# Patient Record
Sex: MALE | Race: WHITE | NOT HISPANIC OR LATINO | Employment: FULL TIME | URBAN - METROPOLITAN AREA
[De-identification: names, ages, dates, MRNs, and addresses within clinical notes are randomized per-mention and may not be internally consistent; named-entity substitution may affect disease eponyms.]

---

## 2023-07-14 LAB
ALANINE AMINOTRANSFERASE (SGPT) (U/L) IN SER/PLAS: 41 U/L (ref 10–52)
ALBUMIN (G/DL) IN SER/PLAS: 4.3 G/DL (ref 3.4–5)
ALKALINE PHOSPHATASE (U/L) IN SER/PLAS: 53 U/L (ref 33–120)
AMPHETAMINE (PRESENCE) IN URINE BY SCREEN METHOD: ABNORMAL
ANION GAP IN SER/PLAS: 12 MMOL/L (ref 10–20)
ASPARTATE AMINOTRANSFERASE (SGOT) (U/L) IN SER/PLAS: 22 U/L (ref 9–39)
BARBITURATES PRESENCE IN URINE BY SCREEN METHOD: ABNORMAL
BASOPHILS (10*3/UL) IN BLOOD BY AUTOMATED COUNT: 0.06 X10E9/L (ref 0–0.1)
BASOPHILS/100 LEUKOCYTES IN BLOOD BY AUTOMATED COUNT: 0.9 % (ref 0–2)
BENZODIAZEPINE (PRESENCE) IN URINE BY SCREEN METHOD: ABNORMAL
BILIRUBIN TOTAL (MG/DL) IN SER/PLAS: 0.6 MG/DL (ref 0–1.2)
CALCIDIOL (25 OH VITAMIN D3) (NG/ML) IN SER/PLAS: 25 NG/ML
CALCIUM (MG/DL) IN SER/PLAS: 9.4 MG/DL (ref 8.6–10.3)
CANNABINOIDS IN URINE BY SCREEN METHOD: ABNORMAL
CARBON DIOXIDE, TOTAL (MMOL/L) IN SER/PLAS: 26 MMOL/L (ref 21–32)
CHLORIDE (MMOL/L) IN SER/PLAS: 105 MMOL/L (ref 98–107)
CHOLESTEROL (MG/DL) IN SER/PLAS: 157 MG/DL (ref 0–199)
CHOLESTEROL IN HDL (MG/DL) IN SER/PLAS: 36.6 MG/DL
CHOLESTEROL/HDL RATIO: 4.3
COBALAMIN (VITAMIN B12) (PG/ML) IN SER/PLAS: 574 PG/ML (ref 211–911)
COCAINE (PRESENCE) IN URINE BY SCREEN METHOD: ABNORMAL
CREATININE (MG/DL) IN SER/PLAS: 0.85 MG/DL (ref 0.5–1.3)
DRUG SCREEN COMMENT URINE: ABNORMAL
EOSINOPHILS (10*3/UL) IN BLOOD BY AUTOMATED COUNT: 0.2 X10E9/L (ref 0–0.7)
EOSINOPHILS/100 LEUKOCYTES IN BLOOD BY AUTOMATED COUNT: 2.9 % (ref 0–6)
ERYTHROCYTE DISTRIBUTION WIDTH (RATIO) BY AUTOMATED COUNT: 13.8 % (ref 11.5–14.5)
ERYTHROCYTE MEAN CORPUSCULAR HEMOGLOBIN CONCENTRATION (G/DL) BY AUTOMATED: 33.3 G/DL (ref 32–36)
ERYTHROCYTE MEAN CORPUSCULAR VOLUME (FL) BY AUTOMATED COUNT: 86 FL (ref 80–100)
ERYTHROCYTES (10*6/UL) IN BLOOD BY AUTOMATED COUNT: 5.01 X10E12/L (ref 4.5–5.9)
ESTIMATED AVERAGE GLUCOSE FOR HBA1C: 103 MG/DL
FENTANYL URINE: ABNORMAL
FERRITIN (UG/LL) IN SER/PLAS: 110 UG/L (ref 20–300)
FOLATE (NG/ML) IN SER/PLAS: 17.2 NG/ML
GFR MALE: >90 ML/MIN/1.73M2
GLUCOSE (MG/DL) IN SER/PLAS: 96 MG/DL (ref 74–99)
HEMATOCRIT (%) IN BLOOD BY AUTOMATED COUNT: 43.2 % (ref 41–52)
HEMOGLOBIN (G/DL) IN BLOOD: 14.4 G/DL (ref 13.5–17.5)
HEMOGLOBIN A1C/HEMOGLOBIN TOTAL IN BLOOD: 5.2 %
IMMATURE GRANULOCYTES/100 LEUKOCYTES IN BLOOD BY AUTOMATED COUNT: 0.1 % (ref 0–0.9)
IRON (UG/DL) IN SER/PLAS: 90 UG/DL (ref 35–150)
IRON BINDING CAPACITY (UG/DL) IN SER/PLAS: 341 UG/DL (ref 240–445)
IRON SATURATION (%) IN SER/PLAS: 26 % (ref 25–45)
LDL: 96 MG/DL (ref 0–99)
LEUKOCYTES (10*3/UL) IN BLOOD BY AUTOMATED COUNT: 6.8 X10E9/L (ref 4.4–11.3)
LYMPHOCYTES (10*3/UL) IN BLOOD BY AUTOMATED COUNT: 2.13 X10E9/L (ref 1.2–4.8)
LYMPHOCYTES/100 LEUKOCYTES IN BLOOD BY AUTOMATED COUNT: 31.3 % (ref 13–44)
METHADONE (PRESENCE) IN URINE BY SCREEN METHOD: ABNORMAL
MONOCYTES (10*3/UL) IN BLOOD BY AUTOMATED COUNT: 0.68 X10E9/L (ref 0.1–1)
MONOCYTES/100 LEUKOCYTES IN BLOOD BY AUTOMATED COUNT: 10 % (ref 2–10)
NEUTROPHILS (10*3/UL) IN BLOOD BY AUTOMATED COUNT: 3.72 X10E9/L (ref 1.2–7.7)
NEUTROPHILS/100 LEUKOCYTES IN BLOOD BY AUTOMATED COUNT: 54.8 % (ref 40–80)
OPIATES (PRESENCE) IN URINE BY SCREEN METHOD: ABNORMAL
OXYCODONE (PRESENCE) IN URINE BY SCREEN METHOD: ABNORMAL
PARATHYRIN INTACT (PG/ML) IN SER/PLAS: 39.2 PG/ML (ref 18.5–88)
PHENCYCLIDINE (PRESENCE) IN URINE BY SCREEN METHOD: ABNORMAL
PLATELETS (10*3/UL) IN BLOOD AUTOMATED COUNT: 420 X10E9/L (ref 150–450)
POTASSIUM (MMOL/L) IN SER/PLAS: 3.9 MMOL/L (ref 3.5–5.3)
PROTEIN TOTAL: 7.6 G/DL (ref 6.4–8.2)
SODIUM (MMOL/L) IN SER/PLAS: 139 MMOL/L (ref 136–145)
THYROTROPIN (MIU/L) IN SER/PLAS BY DETECTION LIMIT <= 0.05 MIU/L: 3.28 MIU/L (ref 0.44–3.98)
THYROXINE (T4) (UG/DL) IN SER/PLAS: 7.9 UG/DL (ref 4.5–11.1)
THYROXINE (T4) FREE INDEX IN SER/PLAS BY CALCULATION: 2.8 (ref 1.6–4.7)
TRIGLYCERIDE (MG/DL) IN SER/PLAS: 120 MG/DL (ref 0–149)
TRIIODOTHYRONINE RESIN UPTAKE (T3RU) % IN SER/PLAS: 36 % (ref 24–41)
UREA NITROGEN (MG/DL) IN SER/PLAS: 12 MG/DL (ref 6–23)
VLDL: 24 MG/DL (ref 0–40)

## 2023-07-15 LAB — H. PYLORI UBIT: NEGATIVE

## 2023-07-19 LAB
COPPER: 113.5 UG/DL (ref 70–140)
ZINC,SERUM OR PLASMA: 88 UG/DL (ref 60–120)

## 2023-07-20 LAB
AMPHETAMINES,URINE: <50 NG/ML
COTININE BLOOD QUANTITATIVE: <5 NG/ML
MDA,URINE: <200 NG/ML
MDEA,URINE: <200 NG/ML
MDMA,UR: <200 NG/ML
METHAMPHETAMINE QUANTITATIVE URINE: <200 NG/ML
NICOTINE BLOOD QUANTITATIVE: <5 NG/ML
PHENTERMINE,UR: >5000 NG/ML
VITAMIN B1, WHOLE BLOOD: NORMAL

## 2023-08-23 ENCOUNTER — HOSPITAL ENCOUNTER (OUTPATIENT)
Dept: DATA CONVERSION | Facility: HOSPITAL | Age: 33
End: 2023-08-23
Attending: SURGERY | Admitting: SURGERY
Payer: MEDICAID

## 2023-08-23 DIAGNOSIS — R12 HEARTBURN: ICD-10-CM

## 2023-08-23 DIAGNOSIS — G47.33 OBSTRUCTIVE SLEEP APNEA (ADULT) (PEDIATRIC): ICD-10-CM

## 2023-08-23 DIAGNOSIS — F41.9 ANXIETY DISORDER, UNSPECIFIED: ICD-10-CM

## 2023-08-23 DIAGNOSIS — K31.89 OTHER DISEASES OF STOMACH AND DUODENUM: ICD-10-CM

## 2023-08-23 DIAGNOSIS — E66.01 MORBID (SEVERE) OBESITY DUE TO EXCESS CALORIES (MULTI): ICD-10-CM

## 2023-08-23 DIAGNOSIS — Z01.818 ENCOUNTER FOR OTHER PREPROCEDURAL EXAMINATION: ICD-10-CM

## 2023-08-28 ENCOUNTER — TELEMEDICINE (OUTPATIENT)
Dept: PRIMARY CARE | Facility: CLINIC | Age: 33
End: 2023-08-28
Payer: MEDICAID

## 2023-08-28 LAB
COMPLETE PATHOLOGY REPORT: NORMAL
CONVERTED CLINICAL DIAGNOSIS-HISTORY: NORMAL
CONVERTED FINAL DIAGNOSIS: NORMAL
CONVERTED FINAL REPORT PDF LINK TO COPY AND PASTE: NORMAL
CONVERTED GROSS DESCRIPTION: NORMAL

## 2023-09-29 VITALS — BODY MASS INDEX: 46.65 KG/M2 | WEIGHT: 315 LBS | HEIGHT: 69 IN

## 2023-09-30 NOTE — H&P
History of Present Illness:   History Present Illness:  Reason for surgery: reflux   HPI:    33M w/ BMI 48 here for EGD before undergoing a sleeve gastrectomy.  occasional reflux     Comorbidities: Obesity, Mild RADHIKA, Anxiety, Back Pain  Social:  The patient is  with 3 children, employed FT  Tobacco use:no, Illicit drug use: no, Alcohol use: Socially  Currently exercisin times per week, biking  History of abuse- Denies  Past Medical History: Sciatica-Left side, Asthma  Past Surgical History: Dental  Family History: Vascular Disorder, Breast CA  Allergies: NKDA  History of DVT/PE: Denies     Review of Systems  Constitutional: not feeling tired.  Eyes: no eyesight problems.  ENT: no hearing loss and no nosebleeds.  Cardiovascular: no intermittent leg claudication.  Respiratory: no chronic cough and no shortness of breath.  Gastrointestinal: no change in bowel habits and no blood in stools.  Genitourinary: no urinary frequency, no incontinence and no hematuria.  Musculoskeletal: no arthralgias, no myalgias and no back pain.  Skin: no skin rashes.  Neurological: no seizures and no frequent falls.  Psychiatric: no depression and not suicidal.  Endocrine: no recent weight gain and no recent weight loss.  Hematologic/Lymphatic: no swollen glands in the neck and does not bruise easily.  All other systems have been reviewed and are negative for complaint    Allergies:        Allergies:  ·  NKDA :   ·  Shellfish : Unknown    Home Medication Review:   Home Medications Reviewed: yes     Impression/Procedure:   ·  Impression and Planned Procedure: 33F w/ BMI 44 here for EGD for reflux.       ERAS (Enhanced Recovery After Surgery):  ·  ERAS Patient: yes   ·  CPM/PAT Utilization: yes   ·  Immunonutrition Recovery Drink Utilization: yes   ·  Carbohydrate Supplement Drink Utilization: no       Physical Exam by System:    Constitutional: Well developed, awake/alert/oriented  x3, no distress, alert and cooperative    Eyes: PERRL, EOMI, clear sclera   ENMT: mucous membranes moist, no apparent injury,  no lesions seen   Head/Neck: Neck supple, no apparent injury, thyroid  without mass or tenderness, No JVD   Respiratory/Thorax: Patent airways, CTAB, normal  breath sounds with good chest expansion, thorax symmetric   Cardiovascular: Regular, rate and rhythm, no murmurs,  warm extremities   Gastrointestinal: Nondistended, soft, non-tender,  no rebound tenderness or guarding, no masses palpable, no organomegaly   Extremities: normal extremities, no cyanosis edema,  contusions or wounds, no clubbing   Neurological: alert and oriented x3, intact senses,  motor, response and reflexes, normal strength   Skin: Warm and dry, no lesions, no rashes     Consent:   COVID-19 Consent:  ·  COVID-19 Risk Consent Surgeon has reviewed key risks related to the risk of marisa COVID-19 and if they contract COVID-19 what the risks are.       Electronic Signatures:  Liliam Berg)   (Signed 23-Aug-2023 08:26)   Co-Signer: History of Present Illness, Allergies, Home Medication Review, Impression/Procedure, Physical Exam, ERAS, Consent, Note Completion  Christy Walker)   (Signed 23-Aug-2023 07:24)   Authored: History of Present Illness, Allergies, Home Medication Review, Impression/Procedure, Physical Exam, ERAS, Consent, Note Completion    Last Updated: 23-Aug-2023 08:26 by Liliam Berg)

## 2023-10-10 DIAGNOSIS — Z98.84 BARIATRIC SURGERY STATUS: ICD-10-CM

## 2023-10-13 ENCOUNTER — TELEMEDICINE CLINICAL SUPPORT (OUTPATIENT)
Dept: SURGERY | Facility: CLINIC | Age: 33
End: 2023-10-13
Payer: MEDICAID

## 2023-10-18 ENCOUNTER — DOCUMENTATION (OUTPATIENT)
Dept: SURGERY | Facility: CLINIC | Age: 33
End: 2023-10-18
Payer: MEDICAID

## 2023-10-18 NOTE — PROGRESS NOTES
Sent patient a [facility] message to remind him to get his B1 drawn so we can submit his case to insurance for final approval.

## 2023-11-20 ENCOUNTER — TELEPHONE (OUTPATIENT)
Dept: SURGERY | Facility: CLINIC | Age: 33
End: 2023-11-20
Payer: MEDICAID

## 2023-11-20 ENCOUNTER — LAB (OUTPATIENT)
Dept: LAB | Facility: LAB | Age: 33
End: 2023-11-20
Payer: MEDICAID

## 2023-11-20 DIAGNOSIS — Z98.84 BARIATRIC SURGERY STATUS: ICD-10-CM

## 2023-11-20 PROCEDURE — 36415 COLL VENOUS BLD VENIPUNCTURE: CPT

## 2023-11-20 NOTE — TELEPHONE ENCOUNTER
Spoke with patient and reminded him of his B1 lab needing to be complete.  He states he will get this done today.  I did remind the patient that if he cannot go today to please make it a priority to go this week as his psych will  on . Once B1 comes back, we can submit his case to insurance.

## 2023-11-27 ENCOUNTER — DOCUMENTATION (OUTPATIENT)
Dept: SURGERY | Facility: CLINIC | Age: 33
End: 2023-11-27
Payer: MEDICAID

## 2023-11-27 DIAGNOSIS — Z98.84 BARIATRIC SURGERY STATUS: ICD-10-CM

## 2023-11-27 NOTE — PROGRESS NOTES
Letter of Medical Necessity    23      ATTN: Pre-Authorization  Department           Tentative Surgery Date:  2024    RE: Soren Cheema    : 1990         BMI: 48.87    Weight: 330      Height: 5'9''     Mr. Soren Cheema suffers from multiple health conditions including Morbid Obesity (E66.01). An extensive clinical evaluation has been completed and the final recommendation has been provided to Mr. Cheema; explanation of short and long term surgical risks vs. benefits has been reviewed at length. Mr. Cheema has verbalized an understanding of the associated risks, has agreed to comply with behavioral and lifestyle changes that support a successful post-surgical outcome, resolution of comorbid conditions and improvement in quality of life.    The patient suffers from the following health conditions:   There is no problem list on file for this patient.        We kindly request careful review of the following documents: Surgeon's consultation with weight related comorbidities and diagnoses, primary care support letter, psychiatric evaluation,  nutrition assessment and other pertinent information required by the member's plan.      Mr. Cheema has attended our pre-operative educational programs and verbalizes that he has an understanding of the behaviors and choices necessary to be successful with bariatric surgery for a lifetime. Mr. Cheema further understands that in order to be successful he must attend post-operative visits at 1week, 6 weeks, 3 months, 6 months, 12 months, and annually to review him progress and consult with our registered dietitian. In addition Mr. Cheema agrees to attend monthly support group meetings hosted by our licensed program staff to further enhance him chances of successful lifetime weight loss. he has agreed to participate in exercise 5 days per week as tolerated and has already been advised  to increase him physical activity in preparation for surgery.              We kindly request  review of prior-authorization for a 1 to 2 day hospital stay for procedure Laparoscopic sleeve gastrectomy  [id number] with Dr. Liliam Berg  NPI:[id number]  TID: [id number] at [facility]. Tentative surgery date 1/27/2024.    Please fax your approval or requests for  additional information to the attention of Stephanie Snow, Patient Navigator at [phone], this is a secured fax line.    We sincerely appreciate your thoughtful review of this case.      Sincerely,  Dr. Liliam Berg  [facility]    Enclosures

## 2023-12-05 DIAGNOSIS — E66.01 MORBID OBESITY DUE TO EXCESS CALORIES (MULTI): Primary | ICD-10-CM

## 2023-12-06 ENCOUNTER — TELEPHONE (OUTPATIENT)
Dept: SURGERY | Facility: CLINIC | Age: 33
End: 2023-12-06
Payer: MEDICAID

## 2023-12-06 PROBLEM — E88.810 METABOLIC SYNDROME: Status: ACTIVE | Noted: 2022-05-23

## 2023-12-06 PROBLEM — E66.01 OBESITY, CLASS III, BMI 40-49.9 (MORBID OBESITY) (MULTI): Status: ACTIVE | Noted: 2022-05-23

## 2023-12-06 PROBLEM — E55.9 VITAMIN D DEFICIENCY: Status: ACTIVE | Noted: 2017-09-09

## 2023-12-06 PROBLEM — M54.41 CHRONIC MIDLINE LOW BACK PAIN WITH RIGHT-SIDED SCIATICA: Status: ACTIVE | Noted: 2022-06-03

## 2023-12-06 PROBLEM — Z98.84 BARIATRIC SURGERY STATUS: Status: ACTIVE | Noted: 2023-12-06

## 2023-12-06 PROBLEM — G89.29 CHRONIC MIDLINE LOW BACK PAIN WITH RIGHT-SIDED SCIATICA: Status: ACTIVE | Noted: 2022-06-03

## 2023-12-06 NOTE — PATIENT INSTRUCTIONS
You met with Dr. Berg today to discuss changing your procedure from Gastric Sleeve to Elpidio-en-Y Gastric Bypass.

## 2023-12-06 NOTE — PROGRESS NOTES
BARIATRIC SURGERY CLINIC  FOLLOW UP NOTE      Name: Soren Cheema  MRN: [id number]      Index Surgery  Date of Surgery: TENTATIVE 2/7/24   Surgeon: Dr. Liliam Berg    Surgical Procedure: PENDING today's visit     HPI: 33 year old male presenting today to discuss the possibility of changing his procedure from gastric sleeve to miguelina-en-y gastric bypass.  Insurance approval for gastric sleeve obtained, patient informed staff of wanting to change procedure upon scheduling.           No current outpatient medications on file.     No current facility-administered medications for this visit.       Comorbidities:  Patient Active Problem List   Diagnosis    Chronic midline low back pain with right-sided sciatica    Metabolic syndrome    Obesity, Class III, BMI 40-49.9 (morbid obesity) (CMS/[facility])    Vitamin D deficiency    Bariatric surgery status         REVIEW OF SYSTEMS:  CONSTITUTIONAL: Patient denies fevers, chills, sweats and weight changes.  CARDIOVASCULAR: Patient denies chest pains, palpitations, orthopnea and paroxysmal nocturnal dyspnea.  RESPIRATORY: No dyspnea on exertion, no wheezing or cough.  GI: No nausea, vomiting, diarrhea, constipation, abdominal pain, hematochezia or melena.  MUSCULOSKELETAL: No myalgias or arthralgias.  NEUROLOGIC: No chronic headaches, no seizures. Patient denies numbness, tingling or weakness.  PSYCHIATRIC: Patient denies problems with mood disturbance. No problems with anxiety.  ENDOCRINE: No excessive urination or excessive thirst.  DERMATOLOGIC: Patient denies any rashes or skin changes.    PHYSICAL EXAM:  There were no vitals taken for this visit.  Virtual visit       ASSESSMENT & PLAN:  33 year old male presenting today to discuss the possibility of changing his procedure from gastric sleeve to miguelina-en-y gastric bypass.  Insurance approval for gastric sleeve obtained, patient informed staff of wanting to change procedure upon scheduling.       Patient's spouse had a gastric bypass  and they have been talking about it and he wants to proceed with gastric bypass instead of sleeve gastrectomy which he was previously interested in.    I explained the main differences between the 2 operations limitations of not being able to use NSAIDs for the risk of ulcer formation and staying away from smoking which she does not do anyway.  Requirement for multivitamin supplementation for the rest of life and high risk of long-term complications related to rerouting of the intestine.  All this was discussed with the patient and he wants to proceed with gastric bypass instead of sleeve.    There is no contraindication for him to proceed with either of these surgeries and we will schedule him for bypass as he desires.

## 2023-12-06 NOTE — TELEPHONE ENCOUNTER
Spoke with patient, he wants gastric bypass as opposed to gastric sleeve.  Approved for sleeve.  Advised that he will meet with Dr. Berg virtually tomorrow and if he decides bypass is the plan then we will need to resubmit his entire case. Patient verbalized understanding

## 2023-12-07 ENCOUNTER — TELEMEDICINE (OUTPATIENT)
Dept: SURGERY | Facility: CLINIC | Age: 33
End: 2023-12-07
Payer: MEDICAID

## 2023-12-07 DIAGNOSIS — M54.41 CHRONIC MIDLINE LOW BACK PAIN WITH RIGHT-SIDED SCIATICA: ICD-10-CM

## 2023-12-07 DIAGNOSIS — E55.9 VITAMIN D DEFICIENCY: ICD-10-CM

## 2023-12-07 DIAGNOSIS — E66.01 OBESITY, CLASS III, BMI 40-49.9 (MORBID OBESITY) (MULTI): Primary | ICD-10-CM

## 2023-12-07 DIAGNOSIS — E88.810 METABOLIC SYNDROME: ICD-10-CM

## 2023-12-07 DIAGNOSIS — Z98.84 BARIATRIC SURGERY STATUS: ICD-10-CM

## 2023-12-07 DIAGNOSIS — G89.29 CHRONIC MIDLINE LOW BACK PAIN WITH RIGHT-SIDED SCIATICA: ICD-10-CM

## 2023-12-07 PROCEDURE — 99212 OFFICE O/P EST SF 10 MIN: CPT | Mod: GT | Performed by: SURGERY

## 2023-12-07 PROCEDURE — 99212 OFFICE O/P EST SF 10 MIN: CPT | Performed by: SURGERY

## 2023-12-08 ENCOUNTER — TELEPHONE (OUTPATIENT)
Dept: SURGERY | Facility: HOSPITAL | Age: 33
End: 2023-12-08
Payer: MEDICAID

## 2023-12-08 DIAGNOSIS — E66.01 MORBID OBESITY (MULTI): Primary | ICD-10-CM

## 2023-12-13 PROBLEM — E66.01 MORBID OBESITY (MULTI): Status: ACTIVE | Noted: 2023-12-08

## 2024-01-10 ENCOUNTER — TELEPHONE (OUTPATIENT)
Dept: SURGERY | Facility: CLINIC | Age: 34
End: 2024-01-10
Payer: MEDICAID

## 2024-01-10 NOTE — TELEPHONE ENCOUNTER
Emailed NG booklet and preop diet   Instructed to start diet on 1/24 and to call if he has questions.   Jessica

## 2024-01-22 PROBLEM — G47.33 OSA (OBSTRUCTIVE SLEEP APNEA): Status: ACTIVE | Noted: 2024-01-22

## 2024-01-22 PROBLEM — E66.01 MORBID OBESITY (MULTI): Status: RESOLVED | Noted: 2023-12-08 | Resolved: 2024-01-22

## 2024-01-22 NOTE — H&P (VIEW-ONLY)
BARIATRIC SURGERY PREOPERATIVE VISIT    Date: 01/22/24  Time: [unfilled]    Name: Soren Cheema    MRN: [id number]    This is a 33 y.o. y.o. male with morbid obesity (Body mass index is 46.22 kg/m².) who plans to undergo Laparoscopic miguelina en y gastric bypass 19810 surgery. They have completed a rigorous preoperative medical work-up and bariatric surgery educational program.     PMH:   Patient Active Problem List   Diagnosis    Chronic midline low back pain with right-sided sciatica    Metabolic syndrome    Obesity, Class III, BMI 40-49.9 (morbid obesity) (CMS/[facility])    Vitamin D deficiency    Bariatric surgery status    RADHIKA (obstructive sleep apnea)       PSH: No past surgical history on file.    Social hx:   Social History     Socioeconomic History    Marital status: Single     Spouse name: Not on file    Number of children: Not on file    Years of education: Not on file    Highest education level: Not on file   Occupational History    Not on file   Tobacco Use    Smoking status: Not on file    Smokeless tobacco: Not on file   Substance and Sexual Activity    Alcohol use: Not on file    Drug use: Not on file    Sexual activity: Not on file   Other Topics Concern    Not on file   Social History Narrative    Not on file     Social Determinants of Health     Financial Resource Strain: Not on file   Food Insecurity: Not on file   Transportation Needs: Not on file   Physical Activity: Not on file   Stress: Not on file   Social Connections: Not on file   Intimate Partner Violence: Not on file   Housing Stability: Not on file       Initial weight: 329  Current weight: There were no vitals filed for this visit.    Preop Clearances:  Cardiac: Cleared  Psych: Cleared    History of Clotting Disorder: No  Anticoagulation plan: N/A    Sleep Study: Mild apnea, Refuses CPAP     EGD 8/23/23:   Findings:       The Z-line was regular and was found 38 cm from the incisors.       The gastroesophageal flap valve was visualized endoscopically  and        classified as Hill Grade II (fold present, opens with respiration).       Striped mildly erythematous mucosa without bleeding was found in the        prepyloric region of the stomach. Biopsies were taken with a cold        forceps for Helicobacter pylori testing. Estimated blood loss was        minimal.       The exam of the stomach was otherwise normal.       The ampulla, duodenal bulb, first portion of the duodenum and second        portion of the duodenum were normal.  Estimated Blood Loss:       Estimated blood loss was minimal.  Impression:            - Z-line regular, 38 cm from the incisors.                         - Gastroesophageal flap valve classified as Hill Grade                          II (fold present, opens with respiration).                         - Erythematous mucosa in the prepyloric region of the                          stomach. Biopsied.                         - Normal ampulla, duodenal bulb, first portion of the                          duodenum and second portion of the duodenum.    Preadmission testing date: PENDING    MEDICATIONS:  Prior to Admission Medications:  No current outpatient medications on file.    ALLERGIES:  Not on File    REVIEW OF SYSTEMS:  GENERAL: Obese. Negative for malaise, significant weight loss and fever  NECK: Negative for lumps, goiter, pain and significant neck swelling  RESPIRATORY: Negative for cough, wheezing or shortness of breath.  CARDIOVASCULAR: Negative for chest pain, leg swelling or palpitations.  GI: Negative for abdominal discomfort, blood in stools or black stools or change in bowel habits  : No history of dysuria, frequency or incontinence  MUSCULOSKELETAL: Negative for joint pain or swelling, back pain or muscle pain.  SKIN: Negative for lesions, rash, and itching.  PSYCH: Negative for sleep disturbance, mood disorder and recent psychosocial stressors.  ENDOCRINE: Negative for cold or heat intolerance, polyuria, polydipsia and  goiter.    PHYSICAL EXAM:  There were no vitals taken for this visit.    General appearance: obese  Skin: warm, no erythema or rashes  Lungs: clear to percussion and auscultation  Heart: regular rhythm and S1, S2 normal  Abdomen: soft, non-tender, no masses, no organomegaly  Extremities: Normal exam of the extremities. No swelling or pain.    No results found for this or any previous visit (from the past 24 hour(s)).    IMPRESSION:  Soren Cheema is a 33 y.o. y.o. male with a BMI of Body mass index is 46.22 kg/m²..    They have been preoperatively evaluated and deemed to be an appropriate candidate for bariatric surgery.  Surgery Type: Laparoscopic miguelina en y gastric bypass 83630    All testing reviewed.  All clearances contained.    PLAN:  The risks of Laparoscopic miguelina en y gastric bypass 63615 surgery including bleeding, leak, wound infection, dehydration, ulcers, internal hernia, DVT/PE, prolonged nausea/vomiting, incomplete resolution of associated medical conditions, reflux, weight regain, vitamin/mineral deficiencies, and death have been explained to the patient and Soren Cheema has expressed understanding and acceptance of them.    The benefits of the above surgery including weight loss, improvement/resolution of associated medical and mental health conditions, improved mobility, and decreased mortality have been explained the the patient and Soren Cheema has expressed understanding and acceptance of them.    Operative and blood transfusion consent forms were signed by the patient and witnessed today.  He was to be placed on DVT prophylaxis after the surgery, will do Lovenox for 4 weeks.    Prescriptions for all required post-operative home medications were sent to the patient's pharmacy today and the patient will pick them up prior to surgery.    Further education was provided on day of surgery instructions and what to expect from the inpatient admission after surgery.    Liliam Berg MD   Bariatric and  Minimally Invasive General Surgery

## 2024-01-22 NOTE — PROGRESS NOTES
BARIATRIC SURGERY PREOPERATIVE VISIT    Date: 01/22/24  Time: [unfilled]    Name: Soren Cheema    MRN: [id number]    This is a 33 y.o. y.o. male with morbid obesity (Body mass index is 46.22 kg/m².) who plans to undergo Laparoscopic miguelina en y gastric bypass 74500 surgery. They have completed a rigorous preoperative medical work-up and bariatric surgery educational program.     PMH:   Patient Active Problem List   Diagnosis    Chronic midline low back pain with right-sided sciatica    Metabolic syndrome    Obesity, Class III, BMI 40-49.9 (morbid obesity) (CMS/[facility])    Vitamin D deficiency    Bariatric surgery status    RADHIKA (obstructive sleep apnea)       PSH: No past surgical history on file.    Social hx:   Social History     Socioeconomic History    Marital status: Single     Spouse name: Not on file    Number of children: Not on file    Years of education: Not on file    Highest education level: Not on file   Occupational History    Not on file   Tobacco Use    Smoking status: Not on file    Smokeless tobacco: Not on file   Substance and Sexual Activity    Alcohol use: Not on file    Drug use: Not on file    Sexual activity: Not on file   Other Topics Concern    Not on file   Social History Narrative    Not on file     Social Determinants of Health     Financial Resource Strain: Not on file   Food Insecurity: Not on file   Transportation Needs: Not on file   Physical Activity: Not on file   Stress: Not on file   Social Connections: Not on file   Intimate Partner Violence: Not on file   Housing Stability: Not on file       Initial weight: 329  Current weight: There were no vitals filed for this visit.    Preop Clearances:  Cardiac: Cleared  Psych: Cleared    History of Clotting Disorder: No  Anticoagulation plan: N/A    Sleep Study: Mild apnea, Refuses CPAP     EGD 8/23/23:   Findings:       The Z-line was regular and was found 38 cm from the incisors.       The gastroesophageal flap valve was visualized endoscopically  and        classified as Hill Grade II (fold present, opens with respiration).       Striped mildly erythematous mucosa without bleeding was found in the        prepyloric region of the stomach. Biopsies were taken with a cold        forceps for Helicobacter pylori testing. Estimated blood loss was        minimal.       The exam of the stomach was otherwise normal.       The ampulla, duodenal bulb, first portion of the duodenum and second        portion of the duodenum were normal.  Estimated Blood Loss:       Estimated blood loss was minimal.  Impression:            - Z-line regular, 38 cm from the incisors.                         - Gastroesophageal flap valve classified as Hill Grade                          II (fold present, opens with respiration).                         - Erythematous mucosa in the prepyloric region of the                          stomach. Biopsied.                         - Normal ampulla, duodenal bulb, first portion of the                          duodenum and second portion of the duodenum.    Preadmission testing date: PENDING    MEDICATIONS:  Prior to Admission Medications:  No current outpatient medications on file.    ALLERGIES:  Not on File    REVIEW OF SYSTEMS:  GENERAL: Obese. Negative for malaise, significant weight loss and fever  NECK: Negative for lumps, goiter, pain and significant neck swelling  RESPIRATORY: Negative for cough, wheezing or shortness of breath.  CARDIOVASCULAR: Negative for chest pain, leg swelling or palpitations.  GI: Negative for abdominal discomfort, blood in stools or black stools or change in bowel habits  : No history of dysuria, frequency or incontinence  MUSCULOSKELETAL: Negative for joint pain or swelling, back pain or muscle pain.  SKIN: Negative for lesions, rash, and itching.  PSYCH: Negative for sleep disturbance, mood disorder and recent psychosocial stressors.  ENDOCRINE: Negative for cold or heat intolerance, polyuria, polydipsia and  goiter.    PHYSICAL EXAM:  There were no vitals taken for this visit.    General appearance: obese  Skin: warm, no erythema or rashes  Lungs: clear to percussion and auscultation  Heart: regular rhythm and S1, S2 normal  Abdomen: soft, non-tender, no masses, no organomegaly  Extremities: Normal exam of the extremities. No swelling or pain.    No results found for this or any previous visit (from the past 24 hour(s)).    IMPRESSION:  Soren Cheema is a 33 y.o. y.o. male with a BMI of Body mass index is 46.22 kg/m²..    They have been preoperatively evaluated and deemed to be an appropriate candidate for bariatric surgery.  Surgery Type: Laparoscopic miguelina en y gastric bypass 30196    All testing reviewed.  All clearances contained.    PLAN:  The risks of Laparoscopic miguelina en y gastric bypass 20491 surgery including bleeding, leak, wound infection, dehydration, ulcers, internal hernia, DVT/PE, prolonged nausea/vomiting, incomplete resolution of associated medical conditions, reflux, weight regain, vitamin/mineral deficiencies, and death have been explained to the patient and Soren Cheema has expressed understanding and acceptance of them.    The benefits of the above surgery including weight loss, improvement/resolution of associated medical and mental health conditions, improved mobility, and decreased mortality have been explained the the patient and Soren Cheema has expressed understanding and acceptance of them.    Operative and blood transfusion consent forms were signed by the patient and witnessed today.  He was to be placed on DVT prophylaxis after the surgery, will do Lovenox for 4 weeks.    Prescriptions for all required post-operative home medications were sent to the patient's pharmacy today and the patient will pick them up prior to surgery.    Further education was provided on day of surgery instructions and what to expect from the inpatient admission after surgery.    Liliam Berg MD   Bariatric and  Minimally Invasive General Surgery

## 2024-01-22 NOTE — PATIENT INSTRUCTIONS
You are scheduled for Gastric Bypass with Dr. Berg on 2/7/2024.     YOU DO NOT NEED TO DO A BOWEL PREP.  You will be on clear liquids only starting the day prior to surgery. Please refer to your final pre op book/RD instructions.     You will receive a phone call the day prior to surgery with your OR arrival time.  Be sure to read over your Pre-Op book for final preparation for surgery.  Make a shopping list and  your supplements prior to surgery.     Prescriptions for Omeprazole (antacid), Oxycodone (pain),  and Ondansetron (anti-Nausea) have been sent to your retail pharmacy. Pick these up if you have not yet done so - these are for after surgery.    Be sure to take the omeprazole every day for six months starting when you get home from the hospital. Open the capsule and sprinkle over SF applesauce, pudding or yogurt. DO NOT MISS A DOSE.     Call with any questions! [phone] for Patricia.

## 2024-01-23 PROBLEM — E66.01 MORBID OBESITY (MULTI): Status: ACTIVE | Noted: 2023-12-08

## 2024-01-25 ENCOUNTER — APPOINTMENT (OUTPATIENT)
Dept: SURGERY | Facility: CLINIC | Age: 34
End: 2024-01-25
Payer: MEDICAID

## 2024-01-25 ENCOUNTER — OFFICE VISIT (OUTPATIENT)
Dept: SURGERY | Facility: CLINIC | Age: 34
End: 2024-01-25
Payer: MEDICAID

## 2024-01-25 VITALS
BODY MASS INDEX: 46.36 KG/M2 | OXYGEN SATURATION: 98 % | DIASTOLIC BLOOD PRESSURE: 69 MMHG | WEIGHT: 313 LBS | SYSTOLIC BLOOD PRESSURE: 109 MMHG | HEIGHT: 69 IN | HEART RATE: 60 BPM

## 2024-01-25 DIAGNOSIS — Z71.3 PRE-BARIATRIC SURGERY NUTRITION EVALUATION: ICD-10-CM

## 2024-01-25 DIAGNOSIS — R11.2 POST-OPERATIVE NAUSEA AND VOMITING: ICD-10-CM

## 2024-01-25 DIAGNOSIS — E88.810 METABOLIC SYNDROME: ICD-10-CM

## 2024-01-25 DIAGNOSIS — Z98.84 BARIATRIC SURGERY STATUS: ICD-10-CM

## 2024-01-25 DIAGNOSIS — Z98.890 POST-OPERATIVE NAUSEA AND VOMITING: ICD-10-CM

## 2024-01-25 DIAGNOSIS — M54.41 CHRONIC MIDLINE LOW BACK PAIN WITH RIGHT-SIDED SCIATICA: ICD-10-CM

## 2024-01-25 DIAGNOSIS — E55.9 VITAMIN D DEFICIENCY: ICD-10-CM

## 2024-01-25 DIAGNOSIS — G89.18 POST-OP PAIN: ICD-10-CM

## 2024-01-25 DIAGNOSIS — E66.01 OBESITY, CLASS III, BMI 40-49.9 (MORBID OBESITY) (MULTI): Primary | ICD-10-CM

## 2024-01-25 DIAGNOSIS — G89.29 CHRONIC MIDLINE LOW BACK PAIN WITH RIGHT-SIDED SCIATICA: ICD-10-CM

## 2024-01-25 DIAGNOSIS — Z29.9 ENCOUNTER FOR DEEP VEIN THROMBOSIS PROPHYLAXIS: ICD-10-CM

## 2024-01-25 DIAGNOSIS — G47.33 OSA (OBSTRUCTIVE SLEEP APNEA): ICD-10-CM

## 2024-01-25 PROCEDURE — 3008F BODY MASS INDEX DOCD: CPT | Performed by: SURGERY

## 2024-01-25 PROCEDURE — 99215 OFFICE O/P EST HI 40 MIN: CPT | Performed by: SURGERY

## 2024-01-25 RX ORDER — METRONIDAZOLE 500 MG/100ML
500 INJECTION, SOLUTION INTRAVENOUS ONCE
Status: CANCELLED | OUTPATIENT
Start: 2024-02-07 | End: 2024-01-25

## 2024-01-25 RX ORDER — ENOXAPARIN SODIUM 100 MG/ML
60 INJECTION SUBCUTANEOUS DAILY
Qty: 28 EACH | Refills: 0 | Status: SHIPPED | OUTPATIENT
Start: 2024-01-25 | End: 2024-02-22 | Stop reason: HOSPADM

## 2024-01-25 RX ORDER — OMEPRAZOLE 40 MG/1
40 CAPSULE, DELAYED RELEASE ORAL
Qty: 90 CAPSULE | Refills: 1 | Status: SHIPPED | OUTPATIENT
Start: 2024-01-25 | End: 2024-07-23

## 2024-01-25 RX ORDER — ONDANSETRON 4 MG/1
4 TABLET, ORALLY DISINTEGRATING ORAL EVERY 6 HOURS PRN
Qty: 60 TABLET | Refills: 1 | Status: SHIPPED | OUTPATIENT
Start: 2024-01-25 | End: 2024-03-18 | Stop reason: WASHOUT

## 2024-01-25 RX ORDER — HEPARIN SODIUM 5000 [USP'U]/ML
5000 INJECTION, SOLUTION INTRAVENOUS; SUBCUTANEOUS ONCE
Status: CANCELLED | OUTPATIENT
Start: 2024-01-25 | End: 2024-01-25

## 2024-01-25 RX ORDER — AMOXICILLIN AND CLAVULANATE POTASSIUM 875; 125 MG/1; MG/1
TABLET, FILM COATED ORAL
Status: ON HOLD | COMMUNITY
End: 2024-02-07 | Stop reason: ALTCHOICE

## 2024-01-25 RX ORDER — SCOLOPAMINE TRANSDERMAL SYSTEM 1 MG/1
1 PATCH, EXTENDED RELEASE TRANSDERMAL ONCE
Status: CANCELLED | OUTPATIENT
Start: 2024-01-25 | End: 2024-01-25

## 2024-01-25 RX ORDER — GABAPENTIN 300 MG/1
600 CAPSULE ORAL ONCE
Status: CANCELLED | OUTPATIENT
Start: 2024-01-25 | End: 2024-01-25

## 2024-01-25 RX ORDER — SODIUM CHLORIDE, SODIUM LACTATE, POTASSIUM CHLORIDE, CALCIUM CHLORIDE 600; 310; 30; 20 MG/100ML; MG/100ML; MG/100ML; MG/100ML
150 INJECTION, SOLUTION INTRAVENOUS CONTINUOUS
Status: CANCELLED | OUTPATIENT
Start: 2024-01-25

## 2024-01-25 RX ORDER — OXYCODONE HCL 5 MG/5 ML
5 SOLUTION, ORAL ORAL EVERY 6 HOURS PRN
Qty: 140 ML | Refills: 0 | Status: SHIPPED | OUTPATIENT
Start: 2024-01-25 | End: 2024-02-09 | Stop reason: HOSPADM

## 2024-02-01 ENCOUNTER — PRE-ADMISSION TESTING (OUTPATIENT)
Dept: PREADMISSION TESTING | Facility: HOSPITAL | Age: 34
End: 2024-02-01
Payer: MEDICAID

## 2024-02-01 VITALS
HEIGHT: 69 IN | WEIGHT: 313.05 LBS | SYSTOLIC BLOOD PRESSURE: 119 MMHG | TEMPERATURE: 96.8 F | HEART RATE: 71 BPM | BODY MASS INDEX: 46.37 KG/M2 | OXYGEN SATURATION: 97 % | RESPIRATION RATE: 20 BRPM | DIASTOLIC BLOOD PRESSURE: 81 MMHG

## 2024-02-01 DIAGNOSIS — E66.01 MORBID OBESITY DUE TO EXCESS CALORIES (MULTI): ICD-10-CM

## 2024-02-01 DIAGNOSIS — Z01.818 PREOP TESTING: Primary | ICD-10-CM

## 2024-02-01 LAB
ABO GROUP (TYPE) IN BLOOD: NORMAL
ALBUMIN SERPL BCP-MCNC: 4.5 G/DL (ref 3.4–5)
ALP SERPL-CCNC: 53 U/L (ref 33–120)
ALT SERPL W P-5'-P-CCNC: 27 U/L (ref 10–52)
ANION GAP SERPL CALC-SCNC: 13 MMOL/L (ref 10–20)
ANTIBODY SCREEN: NORMAL
APPEARANCE UR: CLEAR
AST SERPL W P-5'-P-CCNC: 20 U/L (ref 9–39)
BILIRUB SERPL-MCNC: 0.4 MG/DL (ref 0–1.2)
BILIRUB UR STRIP.AUTO-MCNC: NEGATIVE MG/DL
BUN SERPL-MCNC: 13 MG/DL (ref 6–23)
CALCIUM SERPL-MCNC: 10.1 MG/DL (ref 8.6–10.3)
CHLORIDE SERPL-SCNC: 104 MMOL/L (ref 98–107)
CO2 SERPL-SCNC: 25 MMOL/L (ref 21–32)
COLOR UR: YELLOW
COTININE UR QL SCN: NEGATIVE
CREAT SERPL-MCNC: 0.78 MG/DL (ref 0.5–1.3)
EGFRCR SERPLBLD CKD-EPI 2021: >90 ML/MIN/1.73M*2
ERYTHROCYTE [DISTWIDTH] IN BLOOD BY AUTOMATED COUNT: 12.6 % (ref 11.5–14.5)
GLUCOSE SERPL-MCNC: 82 MG/DL (ref 74–99)
GLUCOSE UR STRIP.AUTO-MCNC: NEGATIVE MG/DL
HCT VFR BLD AUTO: 45.4 % (ref 41–52)
HGB BLD-MCNC: 15.5 G/DL (ref 13.5–17.5)
INR PPP: 1.3 (ref 0.9–1.1)
KETONES UR STRIP.AUTO-MCNC: ABNORMAL MG/DL
LEUKOCYTE ESTERASE UR QL STRIP.AUTO: NEGATIVE
MCH RBC QN AUTO: 29 PG (ref 26–34)
MCHC RBC AUTO-ENTMCNC: 34.1 G/DL (ref 32–36)
MCV RBC AUTO: 85 FL (ref 80–100)
NITRITE UR QL STRIP.AUTO: NEGATIVE
NRBC BLD-RTO: 0 /100 WBCS (ref 0–0)
PH UR STRIP.AUTO: 6 [PH]
PLATELET # BLD AUTO: 438 X10*3/UL (ref 150–450)
POTASSIUM SERPL-SCNC: 4.5 MMOL/L (ref 3.5–5.3)
PROT SERPL-MCNC: 7.5 G/DL (ref 6.4–8.2)
PROT UR STRIP.AUTO-MCNC: NEGATIVE MG/DL
PROTHROMBIN TIME: 14.6 SECONDS (ref 9.8–12.8)
RBC # BLD AUTO: 5.34 X10*6/UL (ref 4.5–5.9)
RBC # UR STRIP.AUTO: NEGATIVE /UL
RH FACTOR (ANTIGEN D): NORMAL
SODIUM SERPL-SCNC: 137 MMOL/L (ref 136–145)
SP GR UR STRIP.AUTO: 1.01
UROBILINOGEN UR STRIP.AUTO-MCNC: <2 MG/DL
WBC # BLD AUTO: 6.1 X10*3/UL (ref 4.4–11.3)

## 2024-02-01 PROCEDURE — 80053 COMPREHEN METABOLIC PANEL: CPT

## 2024-02-01 PROCEDURE — 93010 ELECTROCARDIOGRAM REPORT: CPT | Performed by: INTERNAL MEDICINE

## 2024-02-01 PROCEDURE — 85610 PROTHROMBIN TIME: CPT

## 2024-02-01 PROCEDURE — 36415 COLL VENOUS BLD VENIPUNCTURE: CPT

## 2024-02-01 PROCEDURE — 86901 BLOOD TYPING SEROLOGIC RH(D): CPT

## 2024-02-01 PROCEDURE — 93005 ELECTROCARDIOGRAM TRACING: CPT

## 2024-02-01 PROCEDURE — 85027 COMPLETE CBC AUTOMATED: CPT

## 2024-02-01 PROCEDURE — 81003 URINALYSIS AUTO W/O SCOPE: CPT

## 2024-02-01 ASSESSMENT — DUKE ACTIVITY SCORE INDEX (DASI)
CAN YOU DO YARD WORK LIKE RAKING LEAVES, WEEDING OR PUSHING A MOWER: YES
CAN YOU DO LIGHT WORK AROUND THE HOUSE LIKE DUSTING OR WASHING DISHES: YES
CAN YOU WALK A BLOCK OR TWO ON LEVEL GROUND: YES
CAN YOU HAVE SEXUAL RELATIONS: YES
CAN YOU TAKE CARE OF YOURSELF (EAT, DRESS, BATHE, OR USE TOILET): YES
CAN YOU RUN A SHORT DISTANCE: YES
CAN YOU PARTICIPATE IN STRENOUS SPORTS LIKE SWIMMING, SINGLES TENNIS, FOOTBALL, BASKETBALL, OR SKIING: YES
CAN YOU WALK INDOORS, SUCH AS AROUND YOUR HOUSE: YES
CAN YOU CLIMB A FLIGHT OF STAIRS OR WALK UP A HILL: YES
CAN YOU DO MODERATE WORK AROUND THE HOUSE LIKE VACUUMING, SWEEPING FLOORS OR CARRYING GROCERIES: YES
DASI METS SCORE: 9.9
CAN YOU PARTICIPATE IN MODERATE RECREATIONAL ACTIVITIES LIKE GOLF, BOWLING, DANCING, DOUBLES TENNIS OR THROWING A BASEBALL OR FOOTBALL: YES
CAN YOU DO HEAVY WORK AROUND THE HOUSE LIKE SCRUBBING FLOORS OR LIFTING AND MOVING HEAVY FURNITURE: YES
TOTAL_SCORE: 58.2

## 2024-02-01 ASSESSMENT — PAIN SCALES - GENERAL: PAINLEVEL_OUTOF10: 0 - NO PAIN

## 2024-02-01 ASSESSMENT — PAIN - FUNCTIONAL ASSESSMENT: PAIN_FUNCTIONAL_ASSESSMENT: 0-10

## 2024-02-01 NOTE — PREPROCEDURE INSTRUCTIONS
Medication List            Accurate as of February 1, 2024 10:40 AM. Always use your most recent med list.                amoxicillin-pot clavulanate 875-125 mg tablet  Commonly known as: Augmentin     enoxaparin 60 mg/0.6 mL syringe  Commonly known as: Lovenox  Inject 0.6 mL (60 mg) under the skin once daily for 28 days.     omeprazole 40 mg DR capsule  Commonly known as: PriLOSEC  Take 1 capsule (40 mg) by mouth once daily in the morning. Take before meals. Do not crush or chew. Open capsule, sprinkle beads on SF jello, pudding or applesauce.     ondansetron ODT 4 mg disintegrating tablet  Commonly known as: Zofran-ODT  Take 1 tablet (4 mg) by mouth every 6 hours if needed for nausea or vomiting.     oxyCODONE 5 mg/5 mL solution  Commonly known as: Roxicodone  Take 5 mL (5 mg) by mouth every 6 hours if needed for severe pain (7 - 10) or moderate pain (4 - 6) for up to 7 days.                              NPO Instructions:    Do not eat any food after midnight the night before your surgery/procedure.    Additional Instructions:     Day of Surgery:  Review your medication instructions, take indicated medications  You may chew gum up to TWO hours before your surgery/procedure  Wear  comfortable loose fitting clothing  Do not use moisturizers, creams, lotions or perfume        PRE-OPERATIVE INSTRUCTIONS FOR SURGERY    *Do not eat anything after midnight the night of surgery.  This includes food of any kind (including hard candy, cough drops, mints and gum) and beverages (including coffee and soda).  You may drink up to one 8 ounce glass of water up until three hours before your scheduled surgery time.    *One of our staff members will call you ONE business day before your surgery, between 11a-2p  To let you know the time to arrive.  If you have no received a call by 2 pm, call [phone]  *When you arrive at the hospital-->GO TO Registration on the ground floor  *Stop smoking 24 hours prior to surgery.  No  Marijuana, CBD Oil or Vaping for 48 hours  *No alcohol 24 hours prior to surgery  *You will need a responsible adult to drive you home  -No acrylic nails or nail polish on at least one fingernail, NO polish on toes for foot surgery  -You may be asked to remove your dentures, partial plate, eyeglasses or contact lenses before going to surgery.  Please bring a case for these items.  -Body piercings need to be removed.  Jewelry and valuables should be left at home.  -Put on loose,  comfortable, clean clothing, that will accommodate bandages    HOME PREOPERATIVE ANTIBACTERIAL SHOWER:  -This shower is a way of cleaning the skin with germ killing solution before surgery.  The solution contains Chorhexidine (CHG).   Let your Dr. Know if you are allergic to Chlorhexidine.    NIGHT BEFORE SURGERY:  IF you are having a TOTAL joint replacement- YOU WILL SHOWER 2 NIGHTS PRIOR to surgery    -Take a normal shower and wash your hair  -Turn OFF water to avoid rinsing the antibacterial skin cleanser off (CHG).  -Apply the CHG cleanser to a clean and wet washcloth.  Lather your entire body from the neck down.    -DO NOT USE ON THE HEAD, FACE, or GENITALS.  -RINSE immediately if CHG is in contact with your eyes, ears or mouth  -Gently wash your body.  Have the CHG cleanser stay on your skin for 3 MINUTES.  -After 3 minutes, turn on water and rinse the CHG cleanser off your body completely  -DO NOT WASH with regular soap after using CHG.  -PAT DRY with a clean fresh towel  -DO NOT apply any hayes, deodorants or lotions  -Dress in clean night cloths  **CHG cleanser will cause stains to fabrics if you wash them with bleach after use.     DAY OF SURGERY:  -Take shower-->JUST GET WET.  Turn OFF water.  -REPEAT the CHG cleanse as you did the night before.  -PAT DRY-->    What you may be asked to bring to surgery:  ___Crutches, walker  ___CPAP machine  ___Urine specimen

## 2024-02-01 NOTE — PREPROCEDURE INSTRUCTIONS
Medication List            Accurate as of February 1, 2024 10:40 AM. Always use your most recent med list.                amoxicillin-pot clavulanate 875-125 mg tablet  Commonly known as: Augmentin     enoxaparin 60 mg/0.6 mL syringe  Commonly known as: Lovenox  Inject 0.6 mL (60 mg) under the skin once daily for 28 days.     omeprazole 40 mg DR capsule  Commonly known as: PriLOSEC  Take 1 capsule (40 mg) by mouth once daily in the morning. Take before meals. Do not crush or chew. Open capsule, sprinkle beads on SF jello, pudding or applesauce.     ondansetron ODT 4 mg disintegrating tablet  Commonly known as: Zofran-ODT  Take 1 tablet (4 mg) by mouth every 6 hours if needed for nausea or vomiting.     oxyCODONE 5 mg/5 mL solution  Commonly known as: Roxicodone  Take 5 mL (5 mg) by mouth every 6 hours if needed for severe pain (7 - 10) or moderate pain (4 - 6) for up to 7 days.                              NPO Instructions:    {NPO Instructions:[id number]}    Additional Instructions:     {[facility] AVS INSTR:23398}

## 2024-02-07 ENCOUNTER — ANESTHESIA EVENT (OUTPATIENT)
Dept: OPERATING ROOM | Facility: HOSPITAL | Age: 34
End: 2024-02-07
Payer: MEDICAID

## 2024-02-07 ENCOUNTER — HOSPITAL ENCOUNTER (INPATIENT)
Facility: HOSPITAL | Age: 34
LOS: 2 days | Discharge: HOME | End: 2024-02-09
Attending: SURGERY | Admitting: SURGERY
Payer: MEDICAID

## 2024-02-07 ENCOUNTER — ANESTHESIA (OUTPATIENT)
Dept: OPERATING ROOM | Facility: HOSPITAL | Age: 34
End: 2024-02-07
Payer: MEDICAID

## 2024-02-07 DIAGNOSIS — E66.01 OBESITY, CLASS III, BMI 40-49.9 (MORBID OBESITY) (MULTI): ICD-10-CM

## 2024-02-07 DIAGNOSIS — G47.33 OSA (OBSTRUCTIVE SLEEP APNEA): ICD-10-CM

## 2024-02-07 DIAGNOSIS — E88.810 METABOLIC SYNDROME: Primary | ICD-10-CM

## 2024-02-07 DIAGNOSIS — E66.01 MORBID OBESITY (MULTI): ICD-10-CM

## 2024-02-07 LAB
ABO GROUP (TYPE) IN BLOOD: NORMAL
RH FACTOR (ANTIGEN D): NORMAL

## 2024-02-07 PROCEDURE — A43644 PR LAP GASTRIC BYPASS/ROUX-EN-Y: Performed by: ANESTHESIOLOGY

## 2024-02-07 PROCEDURE — 3600000004 HC OR TIME - INITIAL BASE CHARGE - PROCEDURE LEVEL FOUR: Performed by: SURGERY

## 2024-02-07 PROCEDURE — 7100000002 HC RECOVERY ROOM TIME - EACH INCREMENTAL 1 MINUTE: Performed by: SURGERY

## 2024-02-07 PROCEDURE — 36415 COLL VENOUS BLD VENIPUNCTURE: CPT | Performed by: SURGERY

## 2024-02-07 PROCEDURE — 2500000004 HC RX 250 GENERAL PHARMACY W/ HCPCS (ALT 636 FOR OP/ED): Performed by: SURGERY

## 2024-02-07 PROCEDURE — 2500000004 HC RX 250 GENERAL PHARMACY W/ HCPCS (ALT 636 FOR OP/ED): Performed by: STUDENT IN AN ORGANIZED HEALTH CARE EDUCATION/TRAINING PROGRAM

## 2024-02-07 PROCEDURE — 2500000001 HC RX 250 WO HCPCS SELF ADMINISTERED DRUGS (ALT 637 FOR MEDICARE OP): Performed by: STUDENT IN AN ORGANIZED HEALTH CARE EDUCATION/TRAINING PROGRAM

## 2024-02-07 PROCEDURE — 2500000005 HC RX 250 GENERAL PHARMACY W/O HCPCS: Performed by: SURGERY

## 2024-02-07 PROCEDURE — 43644 LAP GASTRIC BYPASS/ROUX-EN-Y: CPT | Performed by: STUDENT IN AN ORGANIZED HEALTH CARE EDUCATION/TRAINING PROGRAM

## 2024-02-07 PROCEDURE — A4217 STERILE WATER/SALINE, 500 ML: HCPCS | Performed by: SURGERY

## 2024-02-07 PROCEDURE — A43644 PR LAP GASTRIC BYPASS/ROUX-EN-Y

## 2024-02-07 PROCEDURE — 0BQT0ZZ REPAIR DIAPHRAGM, OPEN APPROACH: ICD-10-PCS | Performed by: SURGERY

## 2024-02-07 PROCEDURE — 3700000002 HC GENERAL ANESTHESIA TIME - EACH INCREMENTAL 1 MINUTE: Performed by: SURGERY

## 2024-02-07 PROCEDURE — 7100000001 HC RECOVERY ROOM TIME - INITIAL BASE CHARGE: Performed by: SURGERY

## 2024-02-07 PROCEDURE — 2780000003 HC OR 278 NO HCPCS: Performed by: SURGERY

## 2024-02-07 PROCEDURE — 2500000005 HC RX 250 GENERAL PHARMACY W/O HCPCS: Performed by: STUDENT IN AN ORGANIZED HEALTH CARE EDUCATION/TRAINING PROGRAM

## 2024-02-07 PROCEDURE — 0D164ZA BYPASS STOMACH TO JEJUNUM, PERCUTANEOUS ENDOSCOPIC APPROACH: ICD-10-PCS | Performed by: SURGERY

## 2024-02-07 PROCEDURE — 43644 LAP GASTRIC BYPASS/ROUX-EN-Y: CPT | Performed by: SURGERY

## 2024-02-07 PROCEDURE — 1100000001 HC PRIVATE ROOM DAILY

## 2024-02-07 PROCEDURE — 2500000004 HC RX 250 GENERAL PHARMACY W/ HCPCS (ALT 636 FOR OP/ED): Performed by: ANESTHESIOLOGY

## 2024-02-07 PROCEDURE — 2720000007 HC OR 272 NO HCPCS: Performed by: SURGERY

## 2024-02-07 PROCEDURE — C1781 MESH (IMPLANTABLE): HCPCS | Performed by: SURGERY

## 2024-02-07 PROCEDURE — 2500000004 HC RX 250 GENERAL PHARMACY W/ HCPCS (ALT 636 FOR OP/ED)

## 2024-02-07 PROCEDURE — 2500000005 HC RX 250 GENERAL PHARMACY W/O HCPCS

## 2024-02-07 PROCEDURE — 3700000001 HC GENERAL ANESTHESIA TIME - INITIAL BASE CHARGE: Performed by: SURGERY

## 2024-02-07 PROCEDURE — 3600000009 HC OR TIME - EACH INCREMENTAL 1 MINUTE - PROCEDURE LEVEL FOUR: Performed by: SURGERY

## 2024-02-07 RX ORDER — SIMETHICONE 80 MG
80 TABLET,CHEWABLE ORAL EVERY 4 HOURS PRN
Status: DISCONTINUED | OUTPATIENT
Start: 2024-02-07 | End: 2024-02-09 | Stop reason: HOSPADM

## 2024-02-07 RX ORDER — MEPERIDINE HYDROCHLORIDE 25 MG/ML
12.5 INJECTION INTRAMUSCULAR; INTRAVENOUS; SUBCUTANEOUS EVERY 10 MIN PRN
Status: DISCONTINUED | OUTPATIENT
Start: 2024-02-07 | End: 2024-02-07 | Stop reason: HOSPADM

## 2024-02-07 RX ORDER — PROMETHAZINE HYDROCHLORIDE 25 MG/ML
INJECTION, SOLUTION INTRAMUSCULAR; INTRAVENOUS AS NEEDED
Status: DISCONTINUED | OUTPATIENT
Start: 2024-02-07 | End: 2024-02-07

## 2024-02-07 RX ORDER — KETOROLAC TROMETHAMINE 30 MG/ML
15 INJECTION, SOLUTION INTRAMUSCULAR; INTRAVENOUS EVERY 6 HOURS
Status: DISPENSED | OUTPATIENT
Start: 2024-02-07 | End: 2024-02-09

## 2024-02-07 RX ORDER — ONDANSETRON HYDROCHLORIDE 2 MG/ML
4 INJECTION, SOLUTION INTRAVENOUS EVERY 8 HOURS PRN
Status: DISCONTINUED | OUTPATIENT
Start: 2024-02-07 | End: 2024-02-09 | Stop reason: HOSPADM

## 2024-02-07 RX ORDER — OXYCODONE HCL 5 MG/5 ML
5 SOLUTION, ORAL ORAL EVERY 4 HOURS PRN
Status: DISCONTINUED | OUTPATIENT
Start: 2024-02-07 | End: 2024-02-09 | Stop reason: HOSPADM

## 2024-02-07 RX ORDER — HEPARIN SODIUM 5000 [USP'U]/ML
5000 INJECTION, SOLUTION INTRAVENOUS; SUBCUTANEOUS EVERY 8 HOURS SCHEDULED
Status: DISCONTINUED | OUTPATIENT
Start: 2024-02-07 | End: 2024-02-09 | Stop reason: HOSPADM

## 2024-02-07 RX ORDER — PANTOPRAZOLE SODIUM 40 MG/10ML
40 INJECTION, POWDER, LYOPHILIZED, FOR SOLUTION INTRAVENOUS
Status: DISCONTINUED | OUTPATIENT
Start: 2024-02-08 | End: 2024-02-09 | Stop reason: HOSPADM

## 2024-02-07 RX ORDER — MIDAZOLAM HYDROCHLORIDE 1 MG/ML
INJECTION, SOLUTION INTRAMUSCULAR; INTRAVENOUS AS NEEDED
Status: DISCONTINUED | OUTPATIENT
Start: 2024-02-07 | End: 2024-02-07

## 2024-02-07 RX ORDER — SODIUM CHLORIDE, SODIUM LACTATE, POTASSIUM CHLORIDE, CALCIUM CHLORIDE 600; 310; 30; 20 MG/100ML; MG/100ML; MG/100ML; MG/100ML
150 INJECTION, SOLUTION INTRAVENOUS CONTINUOUS
Status: DISCONTINUED | OUTPATIENT
Start: 2024-02-07 | End: 2024-02-09 | Stop reason: HOSPADM

## 2024-02-07 RX ORDER — GABAPENTIN 300 MG/1
600 CAPSULE ORAL ONCE
Status: COMPLETED | OUTPATIENT
Start: 2024-02-07 | End: 2024-02-07

## 2024-02-07 RX ORDER — ONDANSETRON HYDROCHLORIDE 2 MG/ML
4 INJECTION, SOLUTION INTRAVENOUS ONCE AS NEEDED
Status: DISCONTINUED | OUTPATIENT
Start: 2024-02-07 | End: 2024-02-07 | Stop reason: HOSPADM

## 2024-02-07 RX ORDER — SODIUM CHLORIDE, SODIUM LACTATE, POTASSIUM CHLORIDE, CALCIUM CHLORIDE 600; 310; 30; 20 MG/100ML; MG/100ML; MG/100ML; MG/100ML
100 INJECTION, SOLUTION INTRAVENOUS CONTINUOUS
Status: DISCONTINUED | OUTPATIENT
Start: 2024-02-07 | End: 2024-02-07 | Stop reason: HOSPADM

## 2024-02-07 RX ORDER — HEPARIN SODIUM 5000 [USP'U]/ML
5000 INJECTION, SOLUTION INTRAVENOUS; SUBCUTANEOUS ONCE
Status: COMPLETED | OUTPATIENT
Start: 2024-02-07 | End: 2024-02-07

## 2024-02-07 RX ORDER — MAGNESIUM HYDROXIDE 2400 MG/10ML
10 SUSPENSION ORAL DAILY PRN
Status: DISCONTINUED | OUTPATIENT
Start: 2024-02-07 | End: 2024-02-09 | Stop reason: HOSPADM

## 2024-02-07 RX ORDER — ACETAMINOPHEN 160 MG/5ML
650 SOLUTION ORAL EVERY 4 HOURS PRN
Status: DISCONTINUED | OUTPATIENT
Start: 2024-02-07 | End: 2024-02-09 | Stop reason: HOSPADM

## 2024-02-07 RX ORDER — HYDRALAZINE HYDROCHLORIDE 20 MG/ML
5 INJECTION INTRAMUSCULAR; INTRAVENOUS EVERY 4 HOURS PRN
Status: DISCONTINUED | OUTPATIENT
Start: 2024-02-07 | End: 2024-02-09 | Stop reason: HOSPADM

## 2024-02-07 RX ORDER — DEXAMETHASONE SODIUM PHOSPHATE 4 MG/ML
INJECTION, SOLUTION INTRA-ARTICULAR; INTRALESIONAL; INTRAMUSCULAR; INTRAVENOUS; SOFT TISSUE AS NEEDED
Status: DISCONTINUED | OUTPATIENT
Start: 2024-02-07 | End: 2024-02-07

## 2024-02-07 RX ORDER — HYDRALAZINE HYDROCHLORIDE 20 MG/ML
5 INJECTION INTRAMUSCULAR; INTRAVENOUS EVERY 30 MIN PRN
Status: DISCONTINUED | OUTPATIENT
Start: 2024-02-07 | End: 2024-02-07 | Stop reason: HOSPADM

## 2024-02-07 RX ORDER — ACETAMINOPHEN 325 MG/1
650 TABLET ORAL EVERY 4 HOURS PRN
Status: DISCONTINUED | OUTPATIENT
Start: 2024-02-07 | End: 2024-02-07 | Stop reason: HOSPADM

## 2024-02-07 RX ORDER — DOCUSATE SODIUM 50 MG/5ML
100 LIQUID ORAL 2 TIMES DAILY
Status: DISCONTINUED | OUTPATIENT
Start: 2024-02-07 | End: 2024-02-09 | Stop reason: HOSPADM

## 2024-02-07 RX ORDER — NALOXONE HYDROCHLORIDE 1 MG/ML
0.2 INJECTION INTRAMUSCULAR; INTRAVENOUS; SUBCUTANEOUS EVERY 5 MIN PRN
Status: DISCONTINUED | OUTPATIENT
Start: 2024-02-07 | End: 2024-02-09 | Stop reason: HOSPADM

## 2024-02-07 RX ORDER — DIPHENHYDRAMINE HYDROCHLORIDE 50 MG/ML
12.5 INJECTION INTRAMUSCULAR; INTRAVENOUS ONCE AS NEEDED
Status: DISCONTINUED | OUTPATIENT
Start: 2024-02-07 | End: 2024-02-07 | Stop reason: HOSPADM

## 2024-02-07 RX ORDER — ROCURONIUM BROMIDE 10 MG/ML
INJECTION, SOLUTION INTRAVENOUS AS NEEDED
Status: DISCONTINUED | OUTPATIENT
Start: 2024-02-07 | End: 2024-02-07

## 2024-02-07 RX ORDER — METRONIDAZOLE 500 MG/100ML
500 INJECTION, SOLUTION INTRAVENOUS EVERY 8 HOURS
Status: DISPENSED | OUTPATIENT
Start: 2024-02-07 | End: 2024-02-08

## 2024-02-07 RX ORDER — HYDROMORPHONE HYDROCHLORIDE 1 MG/ML
1 INJECTION, SOLUTION INTRAMUSCULAR; INTRAVENOUS; SUBCUTANEOUS EVERY 5 MIN PRN
Status: DISCONTINUED | OUTPATIENT
Start: 2024-02-07 | End: 2024-02-07 | Stop reason: HOSPADM

## 2024-02-07 RX ORDER — BUPIVACAINE HYDROCHLORIDE 2.5 MG/ML
INJECTION, SOLUTION INFILTRATION; PERINEURAL AS NEEDED
Status: DISCONTINUED | OUTPATIENT
Start: 2024-02-07 | End: 2024-02-07 | Stop reason: HOSPADM

## 2024-02-07 RX ORDER — PROPOFOL 10 MG/ML
INJECTION, EMULSION INTRAVENOUS AS NEEDED
Status: DISCONTINUED | OUTPATIENT
Start: 2024-02-07 | End: 2024-02-07

## 2024-02-07 RX ORDER — SODIUM CHLORIDE 0.9 G/100ML
IRRIGANT IRRIGATION AS NEEDED
Status: DISCONTINUED | OUTPATIENT
Start: 2024-02-07 | End: 2024-02-07 | Stop reason: HOSPADM

## 2024-02-07 RX ORDER — FENTANYL CITRATE 50 UG/ML
INJECTION, SOLUTION INTRAMUSCULAR; INTRAVENOUS AS NEEDED
Status: DISCONTINUED | OUTPATIENT
Start: 2024-02-07 | End: 2024-02-07

## 2024-02-07 RX ORDER — LABETALOL HYDROCHLORIDE 5 MG/ML
5 INJECTION, SOLUTION INTRAVENOUS ONCE AS NEEDED
Status: DISCONTINUED | OUTPATIENT
Start: 2024-02-07 | End: 2024-02-07 | Stop reason: HOSPADM

## 2024-02-07 RX ORDER — OXYCODONE HCL 5 MG/5 ML
10 SOLUTION, ORAL ORAL EVERY 4 HOURS PRN
Status: DISCONTINUED | OUTPATIENT
Start: 2024-02-07 | End: 2024-02-09 | Stop reason: HOSPADM

## 2024-02-07 RX ORDER — ONDANSETRON HYDROCHLORIDE 2 MG/ML
INJECTION, SOLUTION INTRAVENOUS AS NEEDED
Status: DISCONTINUED | OUTPATIENT
Start: 2024-02-07 | End: 2024-02-07

## 2024-02-07 RX ORDER — MIDAZOLAM HYDROCHLORIDE 1 MG/ML
1 INJECTION, SOLUTION INTRAMUSCULAR; INTRAVENOUS ONCE AS NEEDED
Status: DISCONTINUED | OUTPATIENT
Start: 2024-02-07 | End: 2024-02-07 | Stop reason: HOSPADM

## 2024-02-07 RX ORDER — ACETAMINOPHEN 10 MG/ML
1000 INJECTION, SOLUTION INTRAVENOUS EVERY 6 HOURS SCHEDULED
Status: COMPLETED | OUTPATIENT
Start: 2024-02-07 | End: 2024-02-09

## 2024-02-07 RX ORDER — GABAPENTIN 100 MG/1
100 CAPSULE ORAL 2 TIMES DAILY
Status: DISCONTINUED | OUTPATIENT
Start: 2024-02-07 | End: 2024-02-09 | Stop reason: HOSPADM

## 2024-02-07 RX ORDER — LIDOCAINE 560 MG/1
1 PATCH PERCUTANEOUS; TOPICAL; TRANSDERMAL EVERY 24 HOURS
Status: DISCONTINUED | OUTPATIENT
Start: 2024-02-07 | End: 2024-02-09 | Stop reason: HOSPADM

## 2024-02-07 RX ORDER — METRONIDAZOLE 500 MG/100ML
500 INJECTION, SOLUTION INTRAVENOUS ONCE
Status: COMPLETED | OUTPATIENT
Start: 2024-02-07 | End: 2024-02-07

## 2024-02-07 RX ORDER — METOCLOPRAMIDE HYDROCHLORIDE 5 MG/ML
10 INJECTION INTRAMUSCULAR; INTRAVENOUS EVERY 6 HOURS PRN
Status: DISCONTINUED | OUTPATIENT
Start: 2024-02-07 | End: 2024-02-09 | Stop reason: HOSPADM

## 2024-02-07 RX ORDER — SCOLOPAMINE TRANSDERMAL SYSTEM 1 MG/1
1 PATCH, EXTENDED RELEASE TRANSDERMAL ONCE
Status: DISCONTINUED | OUTPATIENT
Start: 2024-02-07 | End: 2024-02-09 | Stop reason: HOSPADM

## 2024-02-07 RX ADMIN — DEXTROSE MONOHYDRATE 3 G: 5 INJECTION INTRAVENOUS at 20:24

## 2024-02-07 RX ADMIN — GABAPENTIN 600 MG: 300 CAPSULE ORAL at 10:58

## 2024-02-07 RX ADMIN — METRONIDAZOLE 500 MG: 500 INJECTION, SOLUTION INTRAVENOUS at 22:57

## 2024-02-07 RX ADMIN — ROCURONIUM BROMIDE 20 MG: 10 INJECTION, SOLUTION INTRAVENOUS at 12:22

## 2024-02-07 RX ADMIN — ACETAMINOPHEN 1000 MG: 10 INJECTION INTRAVENOUS at 17:21

## 2024-02-07 RX ADMIN — HEPARIN SODIUM 5000 UNITS: 5000 INJECTION INTRAVENOUS; SUBCUTANEOUS at 10:58

## 2024-02-07 RX ADMIN — SUGAMMADEX 400 MG: 100 INJECTION, SOLUTION INTRAVENOUS at 13:20

## 2024-02-07 RX ADMIN — PROMETHAZINE HYDROCHLORIDE 6.25 MG: 25 INJECTION INTRAMUSCULAR; INTRAVENOUS at 12:57

## 2024-02-07 RX ADMIN — KETOROLAC TROMETHAMINE 15 MG: 30 INJECTION, SOLUTION INTRAMUSCULAR; INTRAVENOUS at 23:59

## 2024-02-07 RX ADMIN — GABAPENTIN 100 MG: 100 CAPSULE ORAL at 20:21

## 2024-02-07 RX ADMIN — SODIUM CHLORIDE, POTASSIUM CHLORIDE, SODIUM LACTATE AND CALCIUM CHLORIDE: 600; 310; 30; 20 INJECTION, SOLUTION INTRAVENOUS at 12:26

## 2024-02-07 RX ADMIN — MIDAZOLAM 2 MG: 1 INJECTION INTRAMUSCULAR; INTRAVENOUS at 11:31

## 2024-02-07 RX ADMIN — SODIUM CHLORIDE, POTASSIUM CHLORIDE, SODIUM LACTATE AND CALCIUM CHLORIDE 150 ML/HR: 600; 310; 30; 20 INJECTION, SOLUTION INTRAVENOUS at 23:58

## 2024-02-07 RX ADMIN — SODIUM CHLORIDE, POTASSIUM CHLORIDE, SODIUM LACTATE AND CALCIUM CHLORIDE: 600; 310; 30; 20 INJECTION, SOLUTION INTRAVENOUS at 11:31

## 2024-02-07 RX ADMIN — DOCUSATE SODIUM LIQUID 100 MG: 100 LIQUID ORAL at 20:21

## 2024-02-07 RX ADMIN — LIDOCAINE 1 PATCH: 4 PATCH TOPICAL at 17:21

## 2024-02-07 RX ADMIN — HYDROMORPHONE HYDROCHLORIDE 1 MG: 1 INJECTION, SOLUTION INTRAMUSCULAR; INTRAVENOUS; SUBCUTANEOUS at 14:20

## 2024-02-07 RX ADMIN — METRONIDAZOLE 500 MG: 500 INJECTION, SOLUTION INTRAVENOUS at 11:47

## 2024-02-07 RX ADMIN — ROCURONIUM BROMIDE 30 MG: 10 INJECTION, SOLUTION INTRAVENOUS at 11:54

## 2024-02-07 RX ADMIN — HYDROMORPHONE HYDROCHLORIDE 0.5 MG: 2 INJECTION, SOLUTION INTRAMUSCULAR; INTRAVENOUS; SUBCUTANEOUS at 13:14

## 2024-02-07 RX ADMIN — HEPARIN SODIUM 5000 UNITS: 5000 INJECTION INTRAVENOUS; SUBCUTANEOUS at 22:57

## 2024-02-07 RX ADMIN — SODIUM CHLORIDE, POTASSIUM CHLORIDE, SODIUM LACTATE AND CALCIUM CHLORIDE 100 ML/HR: 600; 310; 30; 20 INJECTION, SOLUTION INTRAVENOUS at 13:39

## 2024-02-07 RX ADMIN — Medication 3 G: at 11:33

## 2024-02-07 RX ADMIN — HYDROMORPHONE HYDROCHLORIDE 0.5 MG: 2 INJECTION, SOLUTION INTRAMUSCULAR; INTRAVENOUS; SUBCUTANEOUS at 11:49

## 2024-02-07 RX ADMIN — KETOROLAC TROMETHAMINE 15 MG: 30 INJECTION, SOLUTION INTRAMUSCULAR; INTRAVENOUS at 17:21

## 2024-02-07 RX ADMIN — SODIUM CHLORIDE, POTASSIUM CHLORIDE, SODIUM LACTATE AND CALCIUM CHLORIDE 150 ML/HR: 600; 310; 30; 20 INJECTION, SOLUTION INTRAVENOUS at 22:56

## 2024-02-07 RX ADMIN — ROCURONIUM BROMIDE 50 MG: 10 INJECTION, SOLUTION INTRAVENOUS at 11:37

## 2024-02-07 RX ADMIN — PROPOFOL 300 MG: 10 INJECTION, EMULSION INTRAVENOUS at 11:36

## 2024-02-07 RX ADMIN — HYDROMORPHONE HYDROCHLORIDE 0.5 MG: 2 INJECTION, SOLUTION INTRAMUSCULAR; INTRAVENOUS; SUBCUTANEOUS at 13:20

## 2024-02-07 RX ADMIN — SCOPALAMINE 1 PATCH: 1 PATCH, EXTENDED RELEASE TRANSDERMAL at 10:57

## 2024-02-07 RX ADMIN — FENTANYL CITRATE 100 MCG: 50 INJECTION, SOLUTION INTRAMUSCULAR; INTRAVENOUS at 11:36

## 2024-02-07 RX ADMIN — OXYCODONE HYDROCHLORIDE 10 MG: 5 SOLUTION ORAL at 20:23

## 2024-02-07 RX ADMIN — DEXAMETHASONE SODIUM PHOSPHATE 4 MG: 4 INJECTION, SOLUTION INTRAMUSCULAR; INTRAVENOUS at 11:48

## 2024-02-07 RX ADMIN — ONDANSETRON 4 MG: 2 INJECTION, SOLUTION INTRAMUSCULAR; INTRAVENOUS at 12:57

## 2024-02-07 SDOH — SOCIAL STABILITY: SOCIAL INSECURITY: DO YOU FEEL UNSAFE GOING BACK TO THE PLACE WHERE YOU ARE LIVING?: NO

## 2024-02-07 SDOH — HEALTH STABILITY: MENTAL HEALTH: CURRENT SMOKER: 0

## 2024-02-07 SDOH — SOCIAL STABILITY: SOCIAL INSECURITY: ARE THERE ANY APPARENT SIGNS OF INJURIES/BEHAVIORS THAT COULD BE RELATED TO ABUSE/NEGLECT?: NO

## 2024-02-07 SDOH — SOCIAL STABILITY: SOCIAL INSECURITY: DO YOU FEEL ANYONE HAS EXPLOITED OR TAKEN ADVANTAGE OF YOU FINANCIALLY OR OF YOUR PERSONAL PROPERTY?: NO

## 2024-02-07 SDOH — SOCIAL STABILITY: SOCIAL INSECURITY: DOES ANYONE TRY TO KEEP YOU FROM HAVING/CONTACTING OTHER FRIENDS OR DOING THINGS OUTSIDE YOUR HOME?: NO

## 2024-02-07 SDOH — SOCIAL STABILITY: SOCIAL INSECURITY: ARE YOU OR HAVE YOU BEEN THREATENED OR ABUSED PHYSICALLY, EMOTIONALLY, OR SEXUALLY BY ANYONE?: NO

## 2024-02-07 SDOH — SOCIAL STABILITY: SOCIAL INSECURITY: HAS ANYONE EVER THREATENED TO HURT YOUR FAMILY OR YOUR PETS?: NO

## 2024-02-07 SDOH — SOCIAL STABILITY: SOCIAL INSECURITY: WERE YOU ABLE TO COMPLETE ALL THE BEHAVIORAL HEALTH SCREENINGS?: YES

## 2024-02-07 SDOH — SOCIAL STABILITY: SOCIAL INSECURITY: ABUSE: ADULT

## 2024-02-07 SDOH — SOCIAL STABILITY: SOCIAL INSECURITY: HAVE YOU HAD THOUGHTS OF HARMING ANYONE ELSE?: NO

## 2024-02-07 ASSESSMENT — LIFESTYLE VARIABLES
AUDIT-C TOTAL SCORE: 0
HOW MANY STANDARD DRINKS CONTAINING ALCOHOL DO YOU HAVE ON A TYPICAL DAY: PATIENT DOES NOT DRINK
HOW OFTEN DO YOU HAVE A DRINK CONTAINING ALCOHOL: NEVER
SKIP TO QUESTIONS 9-10: 1
AUDIT-C TOTAL SCORE: 0
HOW OFTEN DO YOU HAVE 6 OR MORE DRINKS ON ONE OCCASION: NEVER

## 2024-02-07 ASSESSMENT — ACTIVITIES OF DAILY LIVING (ADL)
ADEQUATE_TO_COMPLETE_ADL: YES
BATHING: NEEDS ASSISTANCE
GROOMING: NEEDS ASSISTANCE
FEEDING YOURSELF: INDEPENDENT
LACK_OF_TRANSPORTATION: NO
JUDGMENT_ADEQUATE_SAFELY_COMPLETE_DAILY_ACTIVITIES: YES
WALKS IN HOME: NEEDS ASSISTANCE
DRESSING YOURSELF: NEEDS ASSISTANCE
HEARING - RIGHT EAR: FUNCTIONAL
HEARING - LEFT EAR: FUNCTIONAL
TOILETING: NEEDS ASSISTANCE
PATIENT'S MEMORY ADEQUATE TO SAFELY COMPLETE DAILY ACTIVITIES?: YES

## 2024-02-07 ASSESSMENT — PAIN SCALES - GENERAL
PAINLEVEL_OUTOF10: 0 - NO PAIN
PAIN_LEVEL: 0
PAINLEVEL_OUTOF10: 4
PAINLEVEL_OUTOF10: 0 - NO PAIN
PAINLEVEL_OUTOF10: 4
PAINLEVEL_OUTOF10: 0 - NO PAIN
PAINLEVEL_OUTOF10: 0 - NO PAIN
PAINLEVEL_OUTOF10: 3
PAINLEVEL_OUTOF10: 7
PAINLEVEL_OUTOF10: 4
PAINLEVEL_OUTOF10: 3
PAINLEVEL_OUTOF10: 4
PAINLEVEL_OUTOF10: 7
PAINLEVEL_OUTOF10: 4
PAINLEVEL_OUTOF10: 4
PAINLEVEL_OUTOF10: 7

## 2024-02-07 ASSESSMENT — PATIENT HEALTH QUESTIONNAIRE - PHQ9
1. LITTLE INTEREST OR PLEASURE IN DOING THINGS: NOT AT ALL
SUM OF ALL RESPONSES TO PHQ9 QUESTIONS 1 & 2: 0
2. FEELING DOWN, DEPRESSED OR HOPELESS: NOT AT ALL

## 2024-02-07 ASSESSMENT — COGNITIVE AND FUNCTIONAL STATUS - GENERAL
DRESSING REGULAR LOWER BODY CLOTHING: A LITTLE
MOVING FROM LYING ON BACK TO SITTING ON SIDE OF FLAT BED WITH BEDRAILS: A LITTLE
TURNING FROM BACK TO SIDE WHILE IN FLAT BAD: A LITTLE
CLIMB 3 TO 5 STEPS WITH RAILING: A LITTLE
MOVING TO AND FROM BED TO CHAIR: A LITTLE
PERSONAL GROOMING: A LITTLE
STANDING UP FROM CHAIR USING ARMS: A LITTLE
MOBILITY SCORE: 18
WALKING IN HOSPITAL ROOM: A LITTLE
HELP NEEDED FOR BATHING: A LITTLE
MOVING FROM LYING ON BACK TO SITTING ON SIDE OF FLAT BED WITH BEDRAILS: A LITTLE
MOBILITY SCORE: 18
TOILETING: A LITTLE
TURNING FROM BACK TO SIDE WHILE IN FLAT BAD: A LITTLE
MOVING TO AND FROM BED TO CHAIR: A LITTLE
CLIMB 3 TO 5 STEPS WITH RAILING: A LITTLE
WALKING IN HOSPITAL ROOM: A LITTLE
STANDING UP FROM CHAIR USING ARMS: A LITTLE
PATIENT BASELINE BEDBOUND: NO

## 2024-02-07 ASSESSMENT — PAIN - FUNCTIONAL ASSESSMENT
PAIN_FUNCTIONAL_ASSESSMENT: 0-10

## 2024-02-07 ASSESSMENT — COLUMBIA-SUICIDE SEVERITY RATING SCALE - C-SSRS
1. IN THE PAST MONTH, HAVE YOU WISHED YOU WERE DEAD OR WISHED YOU COULD GO TO SLEEP AND NOT WAKE UP?: NO
6. HAVE YOU EVER DONE ANYTHING, STARTED TO DO ANYTHING, OR PREPARED TO DO ANYTHING TO END YOUR LIFE?: NO
2. HAVE YOU ACTUALLY HAD ANY THOUGHTS OF KILLING YOURSELF?: NO

## 2024-02-07 ASSESSMENT — PAIN DESCRIPTION - DESCRIPTORS: DESCRIPTORS: SORE

## 2024-02-07 NOTE — ANESTHESIA PREPROCEDURE EVALUATION
Patient: Soren Cheema    Procedure Information       Date/Time: 02/07/24 1130    Procedure: ROBOT ASSISTED LAPAROSCOPIC GASTRIC BYPASS - Creation Gastric Bypass Laparoscopy    Location: [facility] 09 / [facility]    Surgeons: Liliam Berg MD            Relevant Problems   Anesthesia (within normal limits)      Endocrine   (+) Morbid obesity (CMS/HCC)      Pulmonary   (+) RADHIKA (obstructive sleep apnea)      Musculoskeletal   (+) Chronic midline low back pain with right-sided sciatica       Clinical information reviewed:   Tobacco  Allergies  Meds   Med Hx  Surg Hx   Fam Hx          NPO Detail:  NPO/Void Status  Carbohydrate Drink Given Prior to Surgery? : N  Date of Last Liquid: 02/06/24  Time of Last Liquid: 2330  Date of Last Solid: 02/06/24  Time of Last Solid: 1300  Last Intake Type: Light meal         Physical Exam    Airway  Mallampati: II  TM distance: >3 FB  Neck ROM: full     Cardiovascular - normal exam  Rhythm: regular  Rate: normal     Dental - normal exam     Pulmonary - normal exam     Abdominal            Anesthesia Plan    History of general anesthesia?: no  History of complications of general anesthesia?: no    ASA 3     general     The patient is not a current smoker.    intravenous induction   Postoperative administration of opioids is intended.  Trial extubation is planned.  Anesthetic plan and risks discussed with patient.    Plan discussed with CRNA.

## 2024-02-07 NOTE — OP NOTE
Gastric Bypass Laparoscopic/EGD/TAP BLOCK/HIATAL  HERNIA  REPAIR Operative Note     Date: 2024  OR Location: [facility]    Name: Soren Cheema, : 1990, Age: 33 y.o., MRN: [id number], Sex: male    Diagnosis  Pre-op Diagnosis     * Morbid obesity (CMS/HCC) [E66.01] Post-op Diagnosis     * Morbid obesity (CMS/HCC) [E66.01]     Procedures    * Gastric Bypass Laparoscopic/EGD/TAP BLOCK/HIATAL  HERNIA  REPAIR  MN EGD TRANSORAL BIOPSY SINGLE/MULTIPLE [73812]  Surgeons      * Liliam Berg - Primary    Resident/Fellow/Other Assistant:  Surgeon(s) and Role:     * Gary Reid MD - Assisting    Procedure Summary  Anesthesia: General  ASA: III  Anesthesia Staff: Anesthesiologist: Dionicio Tejeda MD  CRNA: JUAN May-CRNA  Estimated Blood Loss: 15 mL  Intra-op Medications:   Administrations occurring from 1130 to 1400 on 24:   Medication Name Total Dose   BUPivacaine HCl (Marcaine) 0.25 % (2.5 mg/mL) injection 52 mL   sodium chloride 0.9 % irrigation solution 3,500 mL   lactated Ringer's infusion 35 mL   ceFAZolin (Ancef) in dextrose 5% (ADV/MBP) IV 3 g 3 g   metroNIDAZOLE (Flagyl) 500 mg in NaCl (iso-os) 100 mL 500 mg              Anesthesia Record               Intraprocedure I/O Totals          Intake    LR bolus 1500.00 mL    ceFAZolin (Ancef) in dextrose 5% (ADV/MBP) IV 3 g 100.00 mL    metroNIDAZOLE (Flagyl) 500 mg in NaCl (iso-os) 100 mL 100.00 mL    Total Intake 1700 mL          Specimen: No specimens collected     Staff:   Circulator: Harriet Quiroga RN  Relief Circulator: Tiffany Ennis RN  Scrub Person: Ross Johnson; Dave Aguilera         Drains and/or Catheters: * None in log *    Tourniquet Times:         Implants:     Findings: normal anatomy no hiatal hernia     Indications: Soren Cheema is an 33 y.o. male who is having surgery for Morbid obesity (CMS/HCC) [E66.01]. Here for a RnY gastric bypass     The patient was seen in the preoperative area. The risks, benefits,  complications, treatment options, non-operative alternatives, expected recovery and outcomes were discussed with the patient. The possibilities of reaction to medication, pulmonary aspiration, injury to surrounding structures, bleeding, recurrent infection, the need for additional procedures, failure to diagnose a condition, and creating a complication requiring transfusion or operation were discussed with the patient. The patient concurred with the proposed plan, giving informed consent.  The site of surgery was properly noted/marked if necessary per policy. The patient has been actively warmed in preoperative area. Preoperative antibiotics have been ordered and given within 1 hours of incision. Venous thrombosis prophylaxis have been ordered including bilateral sequential compression devices    Procedure Details: [place] Cheema was scheduled for gastric bypass after he met all requirements. On the day of surgery after verification of informed consent he was given subcutaneous heparin and preoperative antibiotics, was taken to or placed in supine position on the table, SCDs were placed, general anesthesia was established, timeout was done, standard sterile preparation and draping of abdominal wall was profound. Entry into abdominal cavity was done using 10 mm port in left MCL area without technical problems 2 additional  5 mm ports were placed in left and right sides. a 12 mm port was placed in right side and 12 mm trocar on left side in mid clavicular lines.  There was no injuries at the entry site. Omentum had adhesion sin RUQ which were taken down with ligasure. Omentum was then retracted and placed in the upper abdomen ligament of Treitz was identified small bowel was divided at 75 cm using 60 mm tan stapler, Elpidio limb was measured to 100 cm, mesentery was divided with LigaSure, limbs were placed side-to-side with proper orientation and stay sutures were placed, enterotomies were made in the antimesenteric border,  45 mm gasca stapler was fired in each direction using double fire technique, common channel of the enterotomy was closed with 60 mm tan stapler in horizontal fashion. There was no technical problems with the anastomosis. JJ mesenteric defect was closed with 2-0 Surgitek in continuous fashion. Omentum was split at the middle and Elpidio limb was passed in antecolic, antigastric fashion. A [place] liver retractor was placed and left lobe of liver was retracted. Examination of hiatus revealed grossly  intact hiatus and there was no gross hiatal hernia. 5 cm from the GE junction area small window was made in the lesser omentum and lesser sac was entered using blunt dissection. A 45 mm purple staple was partially fired in horizontal fashion followed by serial application of 60 mm purple loads with seamguards all the way to the angle of His thus creating a narrow tubular gastric pouch. Staple lines were inspected. There was no technical problems. A gastrostomy was made at junction of first and second staple line and an OrVil was inserted by anesthesiologist. Plastic tubing was removed from abdominal cavity after satisfactory placement of anvil in stomach pouch. Staple end of Elpidio limb was opened with ligasure.  Now a 25/3.5 EEA stapler with seamguard was inserted through the wound protector and entered into the open end of the Proximal jejunum Elpidio limb and a gastrojejunal anastomosis was created between the Elpidio limb and the gastric pouch without technical problems redundant end of the candy-cane limb was resected with 60 mm tan stapler. Staple line was inspected. There was no technical problems. Gastroscope was advanced to gastric pouch and proximal Elpidio limb was occluded anastomosis was submerged underwater seal there was no evidence of any air bubbles there was no internal bleeding anastomosis was patent scope was removed fluid was aspirated. Mesenteric defect between the transverse mesocolon and Elpidio limb mesentery was  closed with 2/0 Surgitek as well. Fascial defects at the EEA stapler site and 12 mm port sites were closed with 0 Vicryl in an interrupted fashion using endo-passer.  TAP block was performed in both anterior axillary lines. Final satisfactory examination abdominal cavity was performed there was no iatrogenic injuries or any active bleeding. Liver retractor and trocars were removed. Pneumoperitoneum was discontinued. Incisions were irrigated. Skin was closed with 3-0 monocryl. Dermabond was applied. Patient tolerated the operation well and was transferred to recovery room in stable condition.   Dr. Reid was scrubbed and actively assisted in entire case with introduction of laparoscopic instruments, dissection, stapling and performing of endoscopy. There was no qualified resident available.       Complications:  None; patient tolerated the procedure well.    Disposition: PACU - hemodynamically stable.  Condition: stable         Additional Details: negative leak test, b/l tap blocks given, EGD performed with normal surgical anatomy      Attending Attestation: I was present and scrubbed for the entire procedure.    Liliam Berg  Phone Number: [phone]

## 2024-02-07 NOTE — ANESTHESIA POSTPROCEDURE EVALUATION
Patient: Soren Cheema    Procedure Summary       Date: 02/07/24 Room / Location: [facility] 09 / [facility]    Anesthesia Start: 1130 Anesthesia Stop:     Procedure: Gastric Bypass Laparoscopic/EGD/TAP BLOCK/HIATAL  HERNIA  REPAIR Diagnosis:       Morbid obesity (CMS/HCC)      (Morbid obesity (CMS/HCC) [E66.01])    Surgeons: Liliam Berg MD Responsible Provider: Dionicio Tejeda MD    Anesthesia Type: general ASA Status: 3            Anesthesia Type: general    Vitals Value Taken Time   /61 02/07/24 1329   Temp 36.1 °C (97 °F) 02/07/24 1327   Pulse 70 02/07/24 1329   Resp 16 02/07/24 1327   SpO2 100 % 02/07/24 1329   Vitals shown include unvalidated device data.    Anesthesia Post Evaluation    Patient location during evaluation: PACU  Patient participation: complete - patient participated  Level of consciousness: awake and alert  Pain score: 0  Pain management: adequate  Airway patency: patent  Cardiovascular status: acceptable  Respiratory status: acceptable  Hydration status: acceptable  Postoperative Nausea and Vomiting: none    No notable events documented.

## 2024-02-07 NOTE — ANESTHESIA PROCEDURE NOTES
Airway  Date/Time: 2/7/2024 11:37 AM  Urgency: elective    Airway not difficult    Staffing  Performed: SRNA   Authorized by: Dionicio Tejeda MD    Performed by: JUAN May-CRNA  Patient location during procedure: OR    Indications and Patient Condition  Indications for airway management: anesthesia and airway protection  Spontaneous ventilation: present  Sedation level: deep  Preoxygenated: yes  Patient position: sniffing  MILS not maintained throughout  Mask difficulty assessment: 1 - vent by mask  Planned trial extubation    Final Airway Details  Final airway type: endotracheal airway      Successful airway: ETT  Cuffed: yes   Successful intubation technique: video laryngoscopy  Facilitating devices/methods: intubating stylet  Endotracheal tube insertion site: oral  Blade type: Calle.  Blade size: Calle 4.  ETT size (mm): 8.0  Cormack-Lehane Classification: grade I - full view of glottis  Placement verified by: chest auscultation and capnometry   Measured from: lips  ETT to lips (cm): 22  Number of attempts at approach: 1

## 2024-02-08 ENCOUNTER — APPOINTMENT (OUTPATIENT)
Dept: RADIOLOGY | Facility: HOSPITAL | Age: 34
End: 2024-02-08
Payer: MEDICAID

## 2024-02-08 LAB
ALBUMIN SERPL BCP-MCNC: 3.8 G/DL (ref 3.4–5)
ALP SERPL-CCNC: 47 U/L (ref 33–120)
ALT SERPL W P-5'-P-CCNC: 27 U/L (ref 10–52)
ANION GAP SERPL CALC-SCNC: 15 MMOL/L (ref 10–20)
AST SERPL W P-5'-P-CCNC: 19 U/L (ref 9–39)
BASOPHILS # BLD AUTO: 0.01 X10*3/UL (ref 0–0.1)
BASOPHILS NFR BLD AUTO: 0.1 %
BILIRUB SERPL-MCNC: 0.7 MG/DL (ref 0–1.2)
BUN SERPL-MCNC: 7 MG/DL (ref 6–23)
CALCIUM SERPL-MCNC: 9.1 MG/DL (ref 8.6–10.3)
CHLORIDE SERPL-SCNC: 103 MMOL/L (ref 98–107)
CO2 SERPL-SCNC: 22 MMOL/L (ref 21–32)
CREAT SERPL-MCNC: 0.75 MG/DL (ref 0.5–1.3)
EGFRCR SERPLBLD CKD-EPI 2021: >90 ML/MIN/1.73M*2
EOSINOPHIL # BLD AUTO: 0.02 X10*3/UL (ref 0–0.7)
EOSINOPHIL NFR BLD AUTO: 0.2 %
ERYTHROCYTE [DISTWIDTH] IN BLOOD BY AUTOMATED COUNT: 12.8 % (ref 11.5–14.5)
GLUCOSE SERPL-MCNC: 95 MG/DL (ref 74–99)
HCT VFR BLD AUTO: 40.7 % (ref 41–52)
HGB BLD-MCNC: 14 G/DL (ref 13.5–17.5)
IMM GRANULOCYTES # BLD AUTO: 0.04 X10*3/UL (ref 0–0.7)
IMM GRANULOCYTES NFR BLD AUTO: 0.4 % (ref 0–0.9)
LYMPHOCYTES # BLD AUTO: 1.97 X10*3/UL (ref 1.2–4.8)
LYMPHOCYTES NFR BLD AUTO: 18.6 %
MCH RBC QN AUTO: 29.2 PG (ref 26–34)
MCHC RBC AUTO-ENTMCNC: 34.4 G/DL (ref 32–36)
MCV RBC AUTO: 85 FL (ref 80–100)
MONOCYTES # BLD AUTO: 1.5 X10*3/UL (ref 0.1–1)
MONOCYTES NFR BLD AUTO: 14.2 %
NEUTROPHILS # BLD AUTO: 7.05 X10*3/UL (ref 1.2–7.7)
NEUTROPHILS NFR BLD AUTO: 66.5 %
NRBC BLD-RTO: 0 /100 WBCS (ref 0–0)
PLATELET # BLD AUTO: 354 X10*3/UL (ref 150–450)
POTASSIUM SERPL-SCNC: 4.1 MMOL/L (ref 3.5–5.3)
PROT SERPL-MCNC: 6.2 G/DL (ref 6.4–8.2)
RBC # BLD AUTO: 4.79 X10*6/UL (ref 4.5–5.9)
SODIUM SERPL-SCNC: 136 MMOL/L (ref 136–145)
WBC # BLD AUTO: 10.6 X10*3/UL (ref 4.4–11.3)

## 2024-02-08 PROCEDURE — 85025 COMPLETE CBC W/AUTO DIFF WBC: CPT | Performed by: STUDENT IN AN ORGANIZED HEALTH CARE EDUCATION/TRAINING PROGRAM

## 2024-02-08 PROCEDURE — 74240 X-RAY XM UPR GI TRC 1CNTRST: CPT | Performed by: RADIOLOGY

## 2024-02-08 PROCEDURE — 74240 X-RAY XM UPR GI TRC 1CNTRST: CPT

## 2024-02-08 PROCEDURE — 2500000004 HC RX 250 GENERAL PHARMACY W/ HCPCS (ALT 636 FOR OP/ED): Performed by: STUDENT IN AN ORGANIZED HEALTH CARE EDUCATION/TRAINING PROGRAM

## 2024-02-08 PROCEDURE — 2500000001 HC RX 250 WO HCPCS SELF ADMINISTERED DRUGS (ALT 637 FOR MEDICARE OP): Performed by: STUDENT IN AN ORGANIZED HEALTH CARE EDUCATION/TRAINING PROGRAM

## 2024-02-08 PROCEDURE — 2500000005 HC RX 250 GENERAL PHARMACY W/O HCPCS: Performed by: STUDENT IN AN ORGANIZED HEALTH CARE EDUCATION/TRAINING PROGRAM

## 2024-02-08 PROCEDURE — C9113 INJ PANTOPRAZOLE SODIUM, VIA: HCPCS | Performed by: STUDENT IN AN ORGANIZED HEALTH CARE EDUCATION/TRAINING PROGRAM

## 2024-02-08 PROCEDURE — 2550000001 HC RX 255 CONTRASTS: Performed by: SURGERY

## 2024-02-08 PROCEDURE — 36415 COLL VENOUS BLD VENIPUNCTURE: CPT | Performed by: STUDENT IN AN ORGANIZED HEALTH CARE EDUCATION/TRAINING PROGRAM

## 2024-02-08 PROCEDURE — 80053 COMPREHEN METABOLIC PANEL: CPT | Performed by: STUDENT IN AN ORGANIZED HEALTH CARE EDUCATION/TRAINING PROGRAM

## 2024-02-08 PROCEDURE — 1100000001 HC PRIVATE ROOM DAILY

## 2024-02-08 RX ADMIN — SIMETHICONE 80 MG: 80 TABLET, CHEWABLE ORAL at 17:46

## 2024-02-08 RX ADMIN — ACETAMINOPHEN 1000 MG: 10 INJECTION INTRAVENOUS at 17:44

## 2024-02-08 RX ADMIN — DOCUSATE SODIUM LIQUID 100 MG: 100 LIQUID ORAL at 08:26

## 2024-02-08 RX ADMIN — KETOROLAC TROMETHAMINE 15 MG: 30 INJECTION, SOLUTION INTRAMUSCULAR; INTRAVENOUS at 21:29

## 2024-02-08 RX ADMIN — LIDOCAINE 1 PATCH: 4 PATCH TOPICAL at 17:19

## 2024-02-08 RX ADMIN — SODIUM CHLORIDE, POTASSIUM CHLORIDE, SODIUM LACTATE AND CALCIUM CHLORIDE 150 ML/HR: 600; 310; 30; 20 INJECTION, SOLUTION INTRAVENOUS at 17:44

## 2024-02-08 RX ADMIN — ACETAMINOPHEN 1000 MG: 10 INJECTION INTRAVENOUS at 00:02

## 2024-02-08 RX ADMIN — DOCUSATE SODIUM LIQUID 100 MG: 100 LIQUID ORAL at 21:28

## 2024-02-08 RX ADMIN — HEPARIN SODIUM 5000 UNITS: 5000 INJECTION INTRAVENOUS; SUBCUTANEOUS at 21:28

## 2024-02-08 RX ADMIN — HEPARIN SODIUM 5000 UNITS: 5000 INJECTION INTRAVENOUS; SUBCUTANEOUS at 05:56

## 2024-02-08 RX ADMIN — ONDANSETRON 4 MG: 2 INJECTION INTRAMUSCULAR; INTRAVENOUS at 07:47

## 2024-02-08 RX ADMIN — PANTOPRAZOLE SODIUM 40 MG: 40 INJECTION, POWDER, FOR SOLUTION INTRAVENOUS at 05:54

## 2024-02-08 RX ADMIN — GABAPENTIN 100 MG: 100 CAPSULE ORAL at 08:26

## 2024-02-08 RX ADMIN — OXYCODONE HYDROCHLORIDE 10 MG: 5 SOLUTION ORAL at 19:33

## 2024-02-08 RX ADMIN — HEPARIN SODIUM 5000 UNITS: 5000 INJECTION INTRAVENOUS; SUBCUTANEOUS at 14:04

## 2024-02-08 RX ADMIN — GABAPENTIN 100 MG: 100 CAPSULE ORAL at 21:29

## 2024-02-08 RX ADMIN — DIATRIZOATE MEGLUMINE AND DIATRIZOATE SODIUM 25 ML: 660; 100 LIQUID ORAL; RECTAL at 10:30

## 2024-02-08 RX ADMIN — KETOROLAC TROMETHAMINE 15 MG: 30 INJECTION, SOLUTION INTRAMUSCULAR; INTRAVENOUS at 11:37

## 2024-02-08 RX ADMIN — ACETAMINOPHEN 1000 MG: 10 INJECTION INTRAVENOUS at 06:00

## 2024-02-08 RX ADMIN — KETOROLAC TROMETHAMINE 15 MG: 30 INJECTION, SOLUTION INTRAMUSCULAR; INTRAVENOUS at 05:52

## 2024-02-08 RX ADMIN — ACETAMINOPHEN 1000 MG: 10 INJECTION INTRAVENOUS at 11:37

## 2024-02-08 ASSESSMENT — COGNITIVE AND FUNCTIONAL STATUS - GENERAL
CLIMB 3 TO 5 STEPS WITH RAILING: A LITTLE
TURNING FROM BACK TO SIDE WHILE IN FLAT BAD: A LITTLE
PERSONAL GROOMING: A LITTLE
TOILETING: A LITTLE
MOVING TO AND FROM BED TO CHAIR: A LITTLE
WALKING IN HOSPITAL ROOM: A LITTLE
MOBILITY SCORE: 18
STANDING UP FROM CHAIR USING ARMS: A LITTLE
MOVING FROM LYING ON BACK TO SITTING ON SIDE OF FLAT BED WITH BEDRAILS: A LITTLE
DAILY ACTIVITIY SCORE: 22

## 2024-02-08 ASSESSMENT — PAIN SCALES - GENERAL
PAINLEVEL_OUTOF10: 0 - NO PAIN
PAINLEVEL_OUTOF10: 6
PAINLEVEL_OUTOF10: 6
PAINLEVEL_OUTOF10: 8
PAINLEVEL_OUTOF10: 0 - NO PAIN
PAINLEVEL_OUTOF10: 3

## 2024-02-08 ASSESSMENT — PAIN - FUNCTIONAL ASSESSMENT: PAIN_FUNCTIONAL_ASSESSMENT: 0-10

## 2024-02-08 NOTE — NURSING NOTE
This nurse just educated patient on how to give self Lovenox injection.  Patient also given handout on the process of lovenox injections. Patient stated no questions at this time and understands the process

## 2024-02-08 NOTE — PROGRESS NOTES
"Soren Cheema is a 33 y.o. male on day 1 of admission presenting with Morbid obesity (CMS/HCC).    Subjective   Patient seen and examined at the bedside. No acute events overnight. Patient denies fevers, chills, chest pain, shortness of breath, or diffuse abdominal pain. Tolerated fluids orally. Patient is ambulating, voiding, and using incentive spirometry. Pain and nausea well controlled with meds.             Objective     Physical Exam  Constitutional:       General: He is not in acute distress.     Appearance: He is obese. He is not ill-appearing or toxic-appearing.   HENT:      Head: Normocephalic.      Mouth/Throat:      Mouth: Mucous membranes are moist.      Pharynx: Oropharynx is clear.   Eyes:      Extraocular Movements: Extraocular movements intact.   Abdominal:      General: Abdomen is flat. There is no distension.      Palpations: Abdomen is soft.      Tenderness: There is abdominal tenderness. There is no guarding.   Musculoskeletal:         General: Normal range of motion.      Cervical back: Normal range of motion. No rigidity.   Lymphadenopathy:      Cervical: No cervical adenopathy.   Skin:     General: Skin is warm.      Coloration: Skin is not jaundiced.      Findings: No bruising.   Neurological:      General: No focal deficit present.      Mental Status: He is alert.   Psychiatric:         Mood and Affect: Mood normal.         Last Recorded Vitals  Blood pressure 104/51, pulse 61, temperature 36.9 °C (98.4 °F), temperature source Temporal, resp. rate 18, height 1.753 m (5' 9.02\"), weight 142 kg (313 lb 0.9 oz), SpO2 94 %.  Intake/Output last 3 Shifts:  I/O last 3 completed shifts:  In: 3008.3 (21.2 mL/kg) [P.O.:510; I.V.:398.3 (2.8 mL/kg); IV Piggyback:2100]  Out: 1700 (12 mL/kg) [Urine:1700 (0.3 mL/kg/hr)]  Weight: 142 kg     Relevant Results                             Assessment/Plan   Active Problems:    Metabolic syndrome    Morbid obesity (CMS/HCC)    Pt is a 32 y/o male with history of " morbid obesity s/p RnY gastric bypass     - Upper GI contrast study today to assess for leak or obstruction  - Advance diet to include full liquids if esophagram does not show leak or obstruction  - Zofran every 6 hours for nausea  - Protonix 40mg IV daily  - Tylenol and oxycodone for pain. Dilaudid as needed for breakthrough pain  - IV fluid support with lactated ringers  - Encourage IS/Ambulation  - Begin discharge planning         I spent 25 minutes in the professional and overall care of this patient.      Gary Reid MD

## 2024-02-08 NOTE — CARE PLAN
Problem: Pain  Goal: Takes deep breaths with improved pain control throughout the shift  Outcome: Progressing  Goal: Turns in bed with improved pain control throughout the shift  Outcome: Progressing  Goal: Walks with improved pain control throughout the shift  Outcome: Progressing  Goal: Performs ADL's with improved pain control throughout shift  Outcome: Progressing  Goal: Participates in PT with improved pain control throughout the shift  Outcome: Progressing  Goal: Free from opioid side effects throughout the shift  Outcome: Progressing  Goal: Free from acute confusion related to pain meds throughout the shift  Outcome: Progressing     Problem: Pain - Adult  Goal: Verbalizes/displays adequate comfort level or baseline comfort level  Outcome: Progressing     Problem: Safety - Adult  Goal: Free from fall injury  Outcome: Progressing     Problem: Discharge Planning  Goal: Discharge to home or other facility with appropriate resources  Outcome: Progressing     Problem: Chronic Conditions and Co-morbidities  Goal: Patient's chronic conditions and co-morbidity symptoms are monitored and maintained or improved  Outcome: Progressing   The patient's goals for the shift include pain contorl    The clinical goals for the shift include pain control

## 2024-02-08 NOTE — PROGRESS NOTES
Nutrition Progress Note    Received consult to see patient about his fluid intake and documentation post gastric bypass.  The patient has been compliant with both intake and documentation and seems to understand the importance of it.

## 2024-02-09 VITALS
DIASTOLIC BLOOD PRESSURE: 63 MMHG | BODY MASS INDEX: 46.37 KG/M2 | WEIGHT: 313.05 LBS | RESPIRATION RATE: 18 BRPM | TEMPERATURE: 98.4 F | HEIGHT: 69 IN | HEART RATE: 72 BPM | OXYGEN SATURATION: 96 % | SYSTOLIC BLOOD PRESSURE: 117 MMHG

## 2024-02-09 LAB
ALBUMIN SERPL BCP-MCNC: 3.3 G/DL (ref 3.4–5)
ALP SERPL-CCNC: 39 U/L (ref 33–120)
ALT SERPL W P-5'-P-CCNC: 19 U/L (ref 10–52)
ANION GAP SERPL CALC-SCNC: 12 MMOL/L (ref 10–20)
AST SERPL W P-5'-P-CCNC: 14 U/L (ref 9–39)
BASOPHILS # BLD AUTO: 0.04 X10*3/UL (ref 0–0.1)
BASOPHILS NFR BLD AUTO: 0.4 %
BILIRUB SERPL-MCNC: 0.7 MG/DL (ref 0–1.2)
BUN SERPL-MCNC: 6 MG/DL (ref 6–23)
CALCIUM SERPL-MCNC: 8.6 MG/DL (ref 8.6–10.3)
CHLORIDE SERPL-SCNC: 105 MMOL/L (ref 98–107)
CO2 SERPL-SCNC: 25 MMOL/L (ref 21–32)
CREAT SERPL-MCNC: 0.68 MG/DL (ref 0.5–1.3)
EGFRCR SERPLBLD CKD-EPI 2021: >90 ML/MIN/1.73M*2
EOSINOPHIL # BLD AUTO: 0.16 X10*3/UL (ref 0–0.7)
EOSINOPHIL NFR BLD AUTO: 1.8 %
ERYTHROCYTE [DISTWIDTH] IN BLOOD BY AUTOMATED COUNT: 13.3 % (ref 11.5–14.5)
GLUCOSE SERPL-MCNC: 82 MG/DL (ref 74–99)
HCT VFR BLD AUTO: 36.2 % (ref 41–52)
HGB BLD-MCNC: 12.4 G/DL (ref 13.5–17.5)
IMM GRANULOCYTES # BLD AUTO: 0.03 X10*3/UL (ref 0–0.7)
IMM GRANULOCYTES NFR BLD AUTO: 0.3 % (ref 0–0.9)
LYMPHOCYTES # BLD AUTO: 2.21 X10*3/UL (ref 1.2–4.8)
LYMPHOCYTES NFR BLD AUTO: 24.3 %
MCH RBC QN AUTO: 29.6 PG (ref 26–34)
MCHC RBC AUTO-ENTMCNC: 34.3 G/DL (ref 32–36)
MCV RBC AUTO: 86 FL (ref 80–100)
MONOCYTES # BLD AUTO: 1.31 X10*3/UL (ref 0.1–1)
MONOCYTES NFR BLD AUTO: 14.4 %
NEUTROPHILS # BLD AUTO: 5.33 X10*3/UL (ref 1.2–7.7)
NEUTROPHILS NFR BLD AUTO: 58.8 %
NRBC BLD-RTO: 0 /100 WBCS (ref 0–0)
PLATELET # BLD AUTO: 315 X10*3/UL (ref 150–450)
POTASSIUM SERPL-SCNC: 4.1 MMOL/L (ref 3.5–5.3)
PROT SERPL-MCNC: 5.7 G/DL (ref 6.4–8.2)
RBC # BLD AUTO: 4.19 X10*6/UL (ref 4.5–5.9)
SODIUM SERPL-SCNC: 138 MMOL/L (ref 136–145)
WBC # BLD AUTO: 9.1 X10*3/UL (ref 4.4–11.3)

## 2024-02-09 PROCEDURE — 36415 COLL VENOUS BLD VENIPUNCTURE: CPT | Performed by: STUDENT IN AN ORGANIZED HEALTH CARE EDUCATION/TRAINING PROGRAM

## 2024-02-09 PROCEDURE — 85025 COMPLETE CBC W/AUTO DIFF WBC: CPT | Performed by: STUDENT IN AN ORGANIZED HEALTH CARE EDUCATION/TRAINING PROGRAM

## 2024-02-09 PROCEDURE — 2500000001 HC RX 250 WO HCPCS SELF ADMINISTERED DRUGS (ALT 637 FOR MEDICARE OP): Performed by: STUDENT IN AN ORGANIZED HEALTH CARE EDUCATION/TRAINING PROGRAM

## 2024-02-09 PROCEDURE — 2500000004 HC RX 250 GENERAL PHARMACY W/ HCPCS (ALT 636 FOR OP/ED): Performed by: STUDENT IN AN ORGANIZED HEALTH CARE EDUCATION/TRAINING PROGRAM

## 2024-02-09 PROCEDURE — C9113 INJ PANTOPRAZOLE SODIUM, VIA: HCPCS | Performed by: STUDENT IN AN ORGANIZED HEALTH CARE EDUCATION/TRAINING PROGRAM

## 2024-02-09 PROCEDURE — 84075 ASSAY ALKALINE PHOSPHATASE: CPT | Performed by: STUDENT IN AN ORGANIZED HEALTH CARE EDUCATION/TRAINING PROGRAM

## 2024-02-09 RX ADMIN — OXYCODONE HYDROCHLORIDE 10 MG: 5 SOLUTION ORAL at 09:20

## 2024-02-09 RX ADMIN — OXYCODONE HYDROCHLORIDE 10 MG: 5 SOLUTION ORAL at 03:25

## 2024-02-09 RX ADMIN — ACETAMINOPHEN 1000 MG: 10 INJECTION INTRAVENOUS at 00:14

## 2024-02-09 RX ADMIN — PANTOPRAZOLE SODIUM 40 MG: 40 INJECTION, POWDER, FOR SOLUTION INTRAVENOUS at 05:51

## 2024-02-09 RX ADMIN — ACETAMINOPHEN 1000 MG: 10 INJECTION INTRAVENOUS at 13:13

## 2024-02-09 RX ADMIN — DOCUSATE SODIUM LIQUID 100 MG: 100 LIQUID ORAL at 09:16

## 2024-02-09 RX ADMIN — HEPARIN SODIUM 5000 UNITS: 5000 INJECTION INTRAVENOUS; SUBCUTANEOUS at 13:13

## 2024-02-09 RX ADMIN — ACETAMINOPHEN 1000 MG: 10 INJECTION INTRAVENOUS at 06:12

## 2024-02-09 RX ADMIN — HEPARIN SODIUM 5000 UNITS: 5000 INJECTION INTRAVENOUS; SUBCUTANEOUS at 05:51

## 2024-02-09 RX ADMIN — GABAPENTIN 100 MG: 100 CAPSULE ORAL at 09:16

## 2024-02-09 RX ADMIN — OXYCODONE HYDROCHLORIDE 10 MG: 5 SOLUTION ORAL at 17:00

## 2024-02-09 RX ADMIN — SODIUM CHLORIDE, POTASSIUM CHLORIDE, SODIUM LACTATE AND CALCIUM CHLORIDE 150 ML/HR: 600; 310; 30; 20 INJECTION, SOLUTION INTRAVENOUS at 00:13

## 2024-02-09 RX ADMIN — SODIUM CHLORIDE, POTASSIUM CHLORIDE, SODIUM LACTATE AND CALCIUM CHLORIDE 150 ML/HR: 600; 310; 30; 20 INJECTION, SOLUTION INTRAVENOUS at 06:12

## 2024-02-09 ASSESSMENT — PAIN - FUNCTIONAL ASSESSMENT
PAIN_FUNCTIONAL_ASSESSMENT: 0-10
PAIN_FUNCTIONAL_ASSESSMENT: 0-10

## 2024-02-09 ASSESSMENT — PAIN SCALES - GENERAL
PAINLEVEL_OUTOF10: 7
PAINLEVEL_OUTOF10: 7
PAINLEVEL_OUTOF10: 8
PAINLEVEL_OUTOF10: 3

## 2024-02-09 NOTE — INTERVAL H&P NOTE
H&P reviewed. The patient was examined and there are no changes to the H&P.   Soft, non-tender, no hepatosplenomegaly, normal bowel sounds

## 2024-02-09 NOTE — DISCHARGE SUMMARY
Discharge Diagnosis  Morbid obesity (CMS/[facility])    Issues Requiring Follow-Up  S/p bariatric surgery    Test Results Pending At Discharge  Pending Labs       No current pending labs.            Hospital Course   Patient presented to the hospital for scheduled gastric bypass. The procedure was carried out without complication. No major events POD#0. POD#1 esophogram was negative for signs of leak or obstruction. Diet advanced to maximum of 4 oz per hour without issue. POD#2 diet was advanced to maximum of 8 oz per hour without issue. No other major events. Discharged home on POD#2 once tolerating 5-8 oz of liquid diet per hour. Patient did well post operatively overall.     Pertinent Physical Exam At Time of Discharge  Physical Exam  Constitutional:       General: He is not in acute distress.     Appearance: Normal appearance.   HENT:      Head: Normocephalic and atraumatic.      Nose: Nose normal.      Mouth/Throat:      Pharynx: Oropharynx is clear.   Cardiovascular:      Rate and Rhythm: Normal rate and regular rhythm.   Pulmonary:      Effort: Pulmonary effort is normal.      Breath sounds: No stridor. No wheezing.   Abdominal:      General: Abdomen is flat. There is no distension.      Palpations: Abdomen is soft.      Tenderness: There is no abdominal tenderness.   Musculoskeletal:         General: No swelling, tenderness or deformity.      Cervical back: Normal range of motion and neck supple.   Skin:     General: Skin is warm and dry.   Neurological:      General: No focal deficit present.      Mental Status: He is alert and oriented to person, place, and time. Mental status is at baseline.   Psychiatric:         Mood and Affect: Mood normal.         Behavior: Behavior normal.         Judgment: Judgment normal.         Home Medications     Medication List      CONTINUE taking these medications     enoxaparin 60 mg/0.6 mL syringe; Commonly known as: Lovenox; Inject 0.6   mL (60 mg) under the skin once daily for  28 days.   omeprazole 40 mg DR capsule; Commonly known as: PriLOSEC; Take 1 capsule   (40 mg) by mouth once daily in the morning. Take before meals. Do not   crush or chew. Open capsule, sprinkle beads on SF jello, pudding or   applesauce.   ondansetron ODT 4 mg disintegrating tablet; Commonly known as:   Zofran-ODT; Take 1 tablet (4 mg) by mouth every 6 hours if needed for   nausea or vomiting.     STOP taking these medications     oxyCODONE 5 mg/5 mL solution; Commonly known as: Roxicodone       Outpatient Follow-Up  Future Appointments   Date Time Provider Department [place]   2/15/2024  8:45 AM Jessica Jessica RDN, MAURICIO [id number] [place]   2/15/2024  9:00 AM MD MARYLOU [id number] [place]   3/18/2024 11:00 AM IHSAN Zarate [id number] [place]   3/18/2024  2:00 PM Jessica Jessica RDN, LD [id number] [place]   4/29/2024  8:45 AM IHSAN Zarate [id number] [place]   4/29/2024  1:30 PM Jessica Jessica RDN, MAURICIO [id number] [place]       Gary Reid MD

## 2024-02-09 NOTE — CARE PLAN
Problem: Pain  Goal: Takes deep breaths with improved pain control throughout the shift  2/9/2024 1743 by Yaneli Baker RN  Outcome: Met  2/9/2024 1020 by Yaneli Baker RN  Outcome: Progressing  Goal: Turns in bed with improved pain control throughout the shift  2/9/2024 1743 by Yaneli Baker RN  Outcome: Met  2/9/2024 1020 by Yaneli Baker RN  Outcome: Progressing  Goal: Walks with improved pain control throughout the shift  2/9/2024 1743 by Yaneli Baker RN  Outcome: Met  2/9/2024 1020 by Yaneli Baker RN  Outcome: Progressing  Goal: Performs ADL's with improved pain control throughout shift  2/9/2024 1743 by Yaneli Baker RN  Outcome: Met  2/9/2024 1020 by Yaneli Baker RN  Outcome: Progressing  Goal: Participates in PT with improved pain control throughout the shift  2/9/2024 1743 by Yaneli Baker RN  Outcome: Met  2/9/2024 1020 by Yaneli Baker RN  Outcome: Progressing  Goal: Free from opioid side effects throughout the shift  2/9/2024 1743 by Yaneli Baker RN  Outcome: Met  2/9/2024 1020 by Yaneli Baker RN  Outcome: Progressing  Goal: Free from acute confusion related to pain meds throughout the shift  2/9/2024 1743 by Yaneli Baker RN  Outcome: Met  2/9/2024 1020 by Yaneli Baker RN  Outcome: Progressing     Problem: Pain - Adult  Goal: Verbalizes/displays adequate comfort level or baseline comfort level  2/9/2024 1743 by Yaneli Baker RN  Outcome: Met  2/9/2024 1020 by Yaneli Baker RN  Outcome: Progressing     Problem: Safety - Adult  Goal: Free from fall injury  2/9/2024 1743 by Yaneli Baker RN  Outcome: Met  2/9/2024 1020 by Yaneli Baker RN  Outcome: Progressing     Problem: Discharge Planning  Goal: Discharge to home or other facility with appropriate resources  2/9/2024 1743 by Yaneli Baker RN  Outcome: Met  2/9/2024 1020 by Yaneli Baker RN  Outcome: Progressing     Problem: Chronic Conditions and Co-morbidities  Goal: Patient's chronic conditions and co-morbidity symptoms are  monitored and maintained or improved  2/9/2024 1743 by Yaneli Baker, RN  Outcome: Met  2/9/2024 1020 by Yaneli Baker RN  Outcome: Progressing

## 2024-02-09 NOTE — CARE PLAN
Problem: Pain  Goal: Takes deep breaths with improved pain control throughout the shift  Outcome: Progressing  Goal: Turns in bed with improved pain control throughout the shift  Outcome: Progressing  Goal: Walks with improved pain control throughout the shift  Outcome: Progressing  Goal: Performs ADL's with improved pain control throughout shift  Outcome: Progressing  Goal: Participates in PT with improved pain control throughout the shift  Outcome: Progressing  Goal: Free from opioid side effects throughout the shift  Outcome: Progressing  Goal: Free from acute confusion related to pain meds throughout the shift  Outcome: Progressing     Problem: Pain - Adult  Goal: Verbalizes/displays adequate comfort level or baseline comfort level  Outcome: Progressing     Problem: Safety - Adult  Goal: Free from fall injury  Outcome: Progressing     Problem: Discharge Planning  Goal: Discharge to home or other facility with appropriate resources  Outcome: Progressing     Problem: Chronic Conditions and Co-morbidities  Goal: Patient's chronic conditions and co-morbidity symptoms are monitored and maintained or improved  Outcome: Progressing

## 2024-02-09 NOTE — CARE PLAN
The patient's goals for the shift include pain contorl    The clinical goals for the shift include Pain control      Problem: Pain  Goal: Takes deep breaths with improved pain control throughout the shift  Outcome: Progressing     Problem: Pain  Goal: Turns in bed with improved pain control throughout the shift  Outcome: Progressing     Problem: Pain  Goal: Walks with improved pain control throughout the shift  Outcome: Progressing     Problem: Pain  Goal: Free from opioid side effects throughout the shift  Outcome: Progressing

## 2024-02-12 ENCOUNTER — TELEPHONE (OUTPATIENT)
Dept: SURGERY | Facility: CLINIC | Age: 34
End: 2024-02-12
Payer: MEDICAID

## 2024-02-12 PROBLEM — E66.01 MORBID OBESITY (MULTI): Status: RESOLVED | Noted: 2023-12-08 | Resolved: 2024-02-12

## 2024-02-12 NOTE — DOCUMENTATION CLARIFICATION NOTE
"    PATIENT:               GAYATHRI WATERS  ACCT #:                  [id number]  MRN:                       [id number]  :                       1990  ADMIT DATE:       2024 10:30 AM  DISCH DATE:        2024 5:43 PM  RESPONDING PROVIDER #:        [id number]          PROVIDER RESPONSE TEXT:    Morbid obesity    CDI QUERY TEXT:    UH_BMI High    Instruction:    Based on your assessment of the patient and the clinical information, please provide the requested documentation by clicking on the appropriate radio button and enter any additional information if prompted.    Question: Is there a diagnosis associated with a BMI of Morbid obesity appropriate for this patient    When answering this query, please exercise your independent professional judgment. The fact that a question is being asked, does not imply that any particular answer is desired or expected.    The patient's clinical indicators include:  Clinical Information  32 yo presenting for bariatric surgery    Clinical Indicators  H and P  Dr. Reid  \"This is a 33 y.o. y.o. male with morbid obesity \" Body mass index is 46.22 kg/m?  \"who plans to undergo Laparoscopic miguelina en y gastric bypass\", \"Metabolic syndrome\", \"Obesity Class III, BMI 40-49.9\", \"RADHIKA\" obstructive sleep apnea    Sleep Study:  \"Mild apnea, Refuses CPAP\"    Treatment  SpO2 monitoring, bariatric surgery    Risk Factors  Metabolic syndrome, BMI 46.21, sleep apnea  Options provided:  -- Morbid obesity  -- Morbid obesity with hypoventilation syndrome  -- Other - I will add my own diagnosis  -- Refer to Clinical Documentation Reviewer    Query created by: Lissy Fields on 2024 10:27 AM      Electronically signed by:  CAROLYNN BUCK MD 2024 7:33 AM          "

## 2024-02-12 NOTE — PROGRESS NOTES
Surgery Date:  2/7/24  Surgeon:  Morena  Procedure:  gastric bypass    ASSESSMENT:  Current weight pounds:   286.0  Ht: 42.23  in.        BMI: 42.23  Previous weight pounds:   313.0   Initial start weight pounds:    338.0   6/8/23  EBW pounds:  174.0   Total weight change pounds: 52.0  %EBW Lost:   30.0%    PROGRESS:  Nutrition Interventions for last encounter (date):   1. Drink 64 oz of fluid daily  2. Drink enough protein shakes to meet your goal of 60-70 g of protein per day  3. Take 2 chewable multivitamins, 0966-9829 mg of calcium citrate, and 500 mcg of B12 daily  4. Get up and walk frequently throughout the day.     CHANGES IN TREATMENT:   Patient met goals:  Partially   24 hour food recall:  Pt on a full liquid diet;  60 g protein and 55-60 oz fluid daily  Beverages:  Powerade zero, protein shakes, soup, broth  Alcohol:   none      Vitamins:    2 Flintstones, 1000 mg calcium citrate Bariatric Fusion, B12 500 mcg   Medications: see list  Physical Activity:  walking     READINESS TO LEARN:  Motivation to learn:           Interested      Understanding of instruction: Good  Anticipated Compliance:   Good      Family Support: Pt's wife present and supportive.     Patient presents with post-op weight loss surgery gastric bypass. The pt is 1 week post op. Patient has lost 52.0  pounds since initial assessment accounting for 30.0% loss excess body weight.  Patient tolerating a full liquid  diet.  Protein intake is adequate for post-op individual. Fluid consumption is adequate. Patient is  supplementing recommended vitamin/minerals. Reminded to take 3 calcium chews daily. Pt states no concerns and/or difficulties.   Reviewed the puree diet to start on 2/19.      Malnutrition Screening  Significant unintentional weight loss? n/a  Eating less than 75% of usual intake for more than 2 weeks? n/a      Nutrition Diagnosis:   1. Increased protein and nutrition needs related to altered GI function as evidenced by pt. s/p  gastric bypass.  2. Food- and nutrition-related knowledge deficit related to lack of prior exposure to surgical weight loss information as evidenced by diet recall.     Nutrition Interventions:   1. Modify type and amount of food and nutrients within meals and snacks.  2. Comprehensive Nutrition Education  -Nutrition education materials: Support Group Schedule       Recommendations:    1. Continue to drink your protein shakes to meet your goal of 60-70 g of protein per day. Begin measuring how much protein you can eat on the soft diet so that you know when to start weaning off of your protein shakes.   2. Continue to drink 64 oz. of zero calorie beverages per day  3. Begin  no drinking 30 min before, during the meal and for 30 minutes after the meal when you start the puree diet on  2/19  4. Continue to exercise   5. Advance to the puree diet on 2/19 or 2 weeks, then soft food  on 3/4 for 2 weeks.  if you do not tolerate the puree diet, go back to the liquid diet for a few more days and then try puree again.  See pages 12-14 in your nutrtion guidelines booklet in your gray folder for more details on puree food.   Try only the pureed  foods on page 14.  I will call you 3/1 to review the soft diet before you start it.   6. Continue to take all of your vitamins and minerals.   Remember to take a total of 3 calcium chews daily.   7.             Attend monthly support groups      Nutrition Monitoring and Evaluation:   1-2 pounds weight loss per week  Criteria: weight check, food recall  Need for Follow-up: 6 weeks post op       Jessica MRATÍNEZ, [facility]  Bariatric Surgery Dietitian  Phone: [phone]  Fax: [phone]

## 2024-02-12 NOTE — TELEPHONE ENCOUNTER
Outbound call to patient to check in post hospital discharge.  Patient states he is feeling really good, hitting daily goal of 64 ounces of liquids.  Patient wants to know if he can put vaseline over his incisions.  He feels like the abdominal binder is causing irritation and itching.  Advised patient to wear a soft t shirt and put the binder on over the t shirt.  Patient feels like whenever he is up and walking he gets a sharp pain in his LUQ. Advised to keep an eye on this, since it is transient and not associated with chest pain we can keep an eye on things.  Patient has not had a bowel movement yet, he is taking Colace currently, advised to add Milk of Magnesia twice daily. Taking Omeprazole and Vitamins.

## 2024-02-12 NOTE — PATIENT INSTRUCTIONS
You are doing great!  You may shower, let soap and water run over incisions, pat dry.  Things will get easier over time.   Remember to increase your fluids and protein to goals  Do not eat and drink at the same time.   Advance your diet to full liquids/pureed foods according to the diet guidelines.   See the dietician as must as you need too.   Slowly increase your exercise and activity, as you get all your protein you will be able to have more energy.   Take your omeprazole, open the capsule and sprinkle onto applesauce. Do not swallow whole.  Take your Multivitamin, B12 and calcium tablets.   Follow-up in 5 weeks for your 6 week post op visit.

## 2024-02-12 NOTE — PROGRESS NOTES
BARIATRIC SURGERY CLINIC  FOLLOW UP NOTE      Name: Soren Cheema  MRN: [id number]      Index Surgery  Date of Surgery: 2/7/24   Surgeon: Dr. Liliam Berg    Surgical Procedure: Laparoscopic miguelina en y gastric bypass 17758    HPI: 33 year old male presenting today for a routine 1 week post op check.  Patient is s/p miguelina-en-y gastric bypass on 2/7/24    Diet Full Liquids    Exercise Ambulation    Concerns related to:  Nausea/Vomiting, Reflux: Denies  Abdominal Pain: Soreness  Diarrhea/Constipation No BM as of 2/12    DAILY SUPPLEMENTS:  Calcium: Calcium Citrate w/ vitamin D (1200 - 1500mg)  Multivitamin & Minerals: 2 per day  Vitamin B12: 500 mcg/day   PPI:Omeprazole       Current Outpatient Medications   Medication Sig Dispense Refill    enoxaparin (Lovenox) 60 mg/0.6 mL syringe Inject 0.6 mL (60 mg) under the skin once daily for 28 days. 28 each 0    omeprazole (PriLOSEC) 40 mg DR capsule Take 1 capsule (40 mg) by mouth once daily in the morning. Take before meals. Do not crush or chew. Open capsule, sprinkle beads on SF jello, pudding or applesauce. 90 capsule 1    ondansetron ODT (Zofran-ODT) 4 mg disintegrating tablet Take 1 tablet (4 mg) by mouth every 6 hours if needed for nausea or vomiting. 60 tablet 1     No current facility-administered medications for this visit.       Comorbidities:  Patient Active Problem List   Diagnosis    Chronic midline low back pain with right-sided sciatica    Metabolic syndrome    Obesity, Class III, BMI 40-49.9 (morbid obesity) (CMS/[facility])    Vitamin D deficiency    S/P gastric bypass    RADHIKA (obstructive sleep apnea)         REVIEW OF SYSTEMS:  CONSTITUTIONAL: Patient denies fevers, chills, sweats and weight changes.  CARDIOVASCULAR: Patient denies chest pains, palpitations, orthopnea and paroxysmal nocturnal dyspnea.  RESPIRATORY: No dyspnea on exertion, no wheezing or cough.  GI: No nausea, vomiting, diarrhea, constipation, abdominal pain, hematochezia or melena.  MUSCULOSKELETAL:  No myalgias or arthralgias.  NEUROLOGIC: No chronic headaches, no seizures. Patient denies numbness, tingling or weakness.  PSYCHIATRIC: Patient denies problems with mood disturbance. No problems with anxiety.  ENDOCRINE: No excessive urination or excessive thirst.  DERMATOLOGIC: Patient denies any rashes or skin changes.    PHYSICAL EXAM:  There were no vitals taken for this visit.  Alert, well appearing, no acute distress, nourished, hydrated.  Anicteric sclera, no ptosis  Unlabored respirations.    Oriented to person, place, time.  Appropriate mood, affect.   Abdomen is soft, nondistended, nontender, incisions are healing well  No sign of infection  No peripheral edema    ASSESSMENT & PLAN:  33 y.o. male presenting for follow up visit s/p miguelina-en-y gastric bypass 2/7/24    No acute post bariatric surgery complications  No acute issues or concerns presented today.  Tolerating diet with appropriate protein and fluid intake  Taking appropriate supplements  Bowel regularity Normal  Doing Lovenox shots  Exercise Walking, advised to increase walking  Weight Loss: Initial  -> Current   Wt Readings from Last 1 Encounters:   02/07/24 142 kg (313 lb 0.9 oz)       Plan:  -Continue to follow protein forward, reduced calorie, whole foods based diet, follow diet direction of bariatric RD  Complete Lovenox course for 28 days  -Continue to participate in regular exercise with goal to achieve 200-300 minutes per week of aerobic & resistance training for preservation of lean body mass.  -Continue multivitamin and supplements to support micronutrient needs  -Ongoing need for anti reflux medications discussed and continued if appropriate - see orders.  -Bowel hygiene reviewed, encouraged adequate fluids, increased dietary fiber and reviewed as needed use of over the counter treatments to promote bowel support.   -Labs - we will order labs at your 3 month follow up      Follow up in 5 weeks as scheduled for your 6 week post op  check

## 2024-02-15 ENCOUNTER — OFFICE VISIT (OUTPATIENT)
Dept: SURGERY | Facility: CLINIC | Age: 34
End: 2024-02-15
Payer: MEDICAID

## 2024-02-15 ENCOUNTER — NUTRITION (OUTPATIENT)
Dept: SURGERY | Facility: CLINIC | Age: 34
End: 2024-02-15
Payer: MEDICAID

## 2024-02-15 VITALS
BODY MASS INDEX: 42.36 KG/M2 | TEMPERATURE: 97.7 F | DIASTOLIC BLOOD PRESSURE: 65 MMHG | OXYGEN SATURATION: 96 % | WEIGHT: 286 LBS | SYSTOLIC BLOOD PRESSURE: 106 MMHG | HEIGHT: 69 IN | HEART RATE: 73 BPM

## 2024-02-15 DIAGNOSIS — G89.18 POST-OP PAIN: ICD-10-CM

## 2024-02-15 DIAGNOSIS — R11.2 POST-OPERATIVE NAUSEA AND VOMITING: ICD-10-CM

## 2024-02-15 DIAGNOSIS — E55.9 VITAMIN D DEFICIENCY: ICD-10-CM

## 2024-02-15 DIAGNOSIS — Z98.890 POST-OPERATIVE NAUSEA AND VOMITING: ICD-10-CM

## 2024-02-15 DIAGNOSIS — Z98.84 S/P GASTRIC BYPASS: Primary | ICD-10-CM

## 2024-02-15 DIAGNOSIS — E66.01 OBESITY, CLASS III, BMI 40-49.9 (MORBID OBESITY) (MULTI): ICD-10-CM

## 2024-02-15 DIAGNOSIS — G47.33 OSA (OBSTRUCTIVE SLEEP APNEA): ICD-10-CM

## 2024-02-15 PROCEDURE — 1036F TOBACCO NON-USER: CPT | Performed by: SURGERY

## 2024-02-15 PROCEDURE — 99024 POSTOP FOLLOW-UP VISIT: CPT | Performed by: SURGERY

## 2024-02-15 PROCEDURE — 3008F BODY MASS INDEX DOCD: CPT | Performed by: SURGERY

## 2024-02-15 ASSESSMENT — PAIN SCALES - GENERAL: PAINLEVEL: 4

## 2024-02-21 ENCOUNTER — APPOINTMENT (OUTPATIENT)
Dept: RADIOLOGY | Facility: HOSPITAL | Age: 34
End: 2024-02-21
Payer: MEDICAID

## 2024-02-21 ENCOUNTER — HOSPITAL ENCOUNTER (OUTPATIENT)
Facility: HOSPITAL | Age: 34
Setting detail: OBSERVATION
Discharge: HOME | End: 2024-02-22
Attending: EMERGENCY MEDICINE | Admitting: SURGERY
Payer: MEDICAID

## 2024-02-21 DIAGNOSIS — R55 SYNCOPE, UNSPECIFIED SYNCOPE TYPE: Primary | ICD-10-CM

## 2024-02-21 DIAGNOSIS — K92.2 GASTROINTESTINAL HEMORRHAGE, UNSPECIFIED GASTROINTESTINAL HEMORRHAGE TYPE: ICD-10-CM

## 2024-02-21 LAB
ABO GROUP (TYPE) IN BLOOD: NORMAL
ALBUMIN SERPL BCP-MCNC: 3.9 G/DL (ref 3.4–5)
ALP SERPL-CCNC: 48 U/L (ref 33–120)
ALT SERPL W P-5'-P-CCNC: 27 U/L (ref 10–52)
ANION GAP SERPL CALC-SCNC: 12 MMOL/L (ref 10–20)
ANTIBODY SCREEN: NORMAL
AST SERPL W P-5'-P-CCNC: 16 U/L (ref 9–39)
BASOPHILS # BLD AUTO: 0.04 X10*3/UL (ref 0–0.1)
BASOPHILS NFR BLD AUTO: 0.4 %
BILIRUB SERPL-MCNC: 0.4 MG/DL (ref 0–1.2)
BUN SERPL-MCNC: 28 MG/DL (ref 6–23)
CALCIUM SERPL-MCNC: 9.2 MG/DL (ref 8.6–10.3)
CHLORIDE SERPL-SCNC: 102 MMOL/L (ref 98–107)
CO2 SERPL-SCNC: 25 MMOL/L (ref 21–32)
CREAT SERPL-MCNC: 0.8 MG/DL (ref 0.5–1.3)
EGFRCR SERPLBLD CKD-EPI 2021: >90 ML/MIN/1.73M*2
EOSINOPHIL # BLD AUTO: 0.08 X10*3/UL (ref 0–0.7)
EOSINOPHIL NFR BLD AUTO: 0.9 %
ERYTHROCYTE [DISTWIDTH] IN BLOOD BY AUTOMATED COUNT: 13 % (ref 11.5–14.5)
GLUCOSE SERPL-MCNC: 93 MG/DL (ref 74–99)
HCT VFR BLD AUTO: 39.6 % (ref 41–52)
HGB BLD-MCNC: 13.5 G/DL (ref 13.5–17.5)
IMM GRANULOCYTES # BLD AUTO: 0.04 X10*3/UL (ref 0–0.7)
IMM GRANULOCYTES NFR BLD AUTO: 0.4 % (ref 0–0.9)
INR PPP: 1.4 (ref 0.9–1.1)
LACTATE SERPL-SCNC: 0.8 MMOL/L (ref 0.4–2)
LYMPHOCYTES # BLD AUTO: 1.34 X10*3/UL (ref 1.2–4.8)
LYMPHOCYTES NFR BLD AUTO: 14.4 %
MCH RBC QN AUTO: 29.2 PG (ref 26–34)
MCHC RBC AUTO-ENTMCNC: 34.1 G/DL (ref 32–36)
MCV RBC AUTO: 86 FL (ref 80–100)
MONOCYTES # BLD AUTO: 0.73 X10*3/UL (ref 0.1–1)
MONOCYTES NFR BLD AUTO: 7.8 %
NEUTROPHILS # BLD AUTO: 7.1 X10*3/UL (ref 1.2–7.7)
NEUTROPHILS NFR BLD AUTO: 76.1 %
NRBC BLD-RTO: 0 /100 WBCS (ref 0–0)
PLATELET # BLD AUTO: 436 X10*3/UL (ref 150–450)
POTASSIUM SERPL-SCNC: 4.4 MMOL/L (ref 3.5–5.3)
PROT SERPL-MCNC: 7.2 G/DL (ref 6.4–8.2)
PROTHROMBIN TIME: 16 SECONDS (ref 9.8–12.8)
RBC # BLD AUTO: 4.63 X10*6/UL (ref 4.5–5.9)
RH FACTOR (ANTIGEN D): NORMAL
SODIUM SERPL-SCNC: 135 MMOL/L (ref 136–145)
WBC # BLD AUTO: 9.3 X10*3/UL (ref 4.4–11.3)

## 2024-02-21 PROCEDURE — 74174 CTA ABD&PLVS W/CONTRAST: CPT | Performed by: RADIOLOGY

## 2024-02-21 PROCEDURE — G0390 TRAUMA RESPONS W/HOSP CRITI: HCPCS

## 2024-02-21 PROCEDURE — 70450 CT HEAD/BRAIN W/O DYE: CPT

## 2024-02-21 PROCEDURE — G0378 HOSPITAL OBSERVATION PER HR: HCPCS

## 2024-02-21 PROCEDURE — 72125 CT NECK SPINE W/O DYE: CPT

## 2024-02-21 PROCEDURE — 85025 COMPLETE CBC W/AUTO DIFF WBC: CPT | Performed by: PHYSICIAN ASSISTANT

## 2024-02-21 PROCEDURE — 36415 COLL VENOUS BLD VENIPUNCTURE: CPT | Performed by: PHYSICIAN ASSISTANT

## 2024-02-21 PROCEDURE — 72125 CT NECK SPINE W/O DYE: CPT | Performed by: STUDENT IN AN ORGANIZED HEALTH CARE EDUCATION/TRAINING PROGRAM

## 2024-02-21 PROCEDURE — 2550000001 HC RX 255 CONTRASTS: Performed by: EMERGENCY MEDICINE

## 2024-02-21 PROCEDURE — 2500000004 HC RX 250 GENERAL PHARMACY W/ HCPCS (ALT 636 FOR OP/ED): Performed by: EMERGENCY MEDICINE

## 2024-02-21 PROCEDURE — 96375 TX/PRO/DX INJ NEW DRUG ADDON: CPT

## 2024-02-21 PROCEDURE — 2500000004 HC RX 250 GENERAL PHARMACY W/ HCPCS (ALT 636 FOR OP/ED): Performed by: STUDENT IN AN ORGANIZED HEALTH CARE EDUCATION/TRAINING PROGRAM

## 2024-02-21 PROCEDURE — 74174 CTA ABD&PLVS W/CONTRAST: CPT

## 2024-02-21 PROCEDURE — 96361 HYDRATE IV INFUSION ADD-ON: CPT

## 2024-02-21 PROCEDURE — C9113 INJ PANTOPRAZOLE SODIUM, VIA: HCPCS | Performed by: EMERGENCY MEDICINE

## 2024-02-21 PROCEDURE — 71275 CT ANGIOGRAPHY CHEST: CPT | Performed by: RADIOLOGY

## 2024-02-21 PROCEDURE — 99291 CRITICAL CARE FIRST HOUR: CPT | Performed by: EMERGENCY MEDICINE

## 2024-02-21 PROCEDURE — 83605 ASSAY OF LACTIC ACID: CPT | Performed by: PHYSICIAN ASSISTANT

## 2024-02-21 PROCEDURE — 85610 PROTHROMBIN TIME: CPT | Performed by: PHYSICIAN ASSISTANT

## 2024-02-21 PROCEDURE — 86901 BLOOD TYPING SEROLOGIC RH(D): CPT | Performed by: PHYSICIAN ASSISTANT

## 2024-02-21 PROCEDURE — 80053 COMPREHEN METABOLIC PANEL: CPT | Performed by: PHYSICIAN ASSISTANT

## 2024-02-21 PROCEDURE — 70450 CT HEAD/BRAIN W/O DYE: CPT | Performed by: STUDENT IN AN ORGANIZED HEALTH CARE EDUCATION/TRAINING PROGRAM

## 2024-02-21 PROCEDURE — 96374 THER/PROPH/DIAG INJ IV PUSH: CPT

## 2024-02-21 RX ORDER — ONDANSETRON HYDROCHLORIDE 2 MG/ML
4 INJECTION, SOLUTION INTRAVENOUS ONCE
Status: COMPLETED | OUTPATIENT
Start: 2024-02-21 | End: 2024-02-21

## 2024-02-21 RX ORDER — ACETAMINOPHEN 160 MG/5ML
650 SOLUTION ORAL EVERY 6 HOURS
Status: DISCONTINUED | OUTPATIENT
Start: 2024-02-21 | End: 2024-02-22 | Stop reason: HOSPADM

## 2024-02-21 RX ORDER — METOCLOPRAMIDE 10 MG/1
10 TABLET ORAL EVERY 6 HOURS PRN
Status: DISCONTINUED | OUTPATIENT
Start: 2024-02-21 | End: 2024-02-22 | Stop reason: HOSPADM

## 2024-02-21 RX ORDER — FENTANYL CITRATE 50 UG/ML
50 INJECTION, SOLUTION INTRAMUSCULAR; INTRAVENOUS ONCE
Status: COMPLETED | OUTPATIENT
Start: 2024-02-21 | End: 2024-02-21

## 2024-02-21 RX ORDER — METOCLOPRAMIDE HYDROCHLORIDE 5 MG/ML
10 INJECTION INTRAMUSCULAR; INTRAVENOUS EVERY 6 HOURS PRN
Status: DISCONTINUED | OUTPATIENT
Start: 2024-02-21 | End: 2024-02-22 | Stop reason: HOSPADM

## 2024-02-21 RX ORDER — ACETAMINOPHEN 325 MG/1
650 TABLET ORAL EVERY 6 HOURS
Status: DISCONTINUED | OUTPATIENT
Start: 2024-02-21 | End: 2024-02-22 | Stop reason: HOSPADM

## 2024-02-21 RX ORDER — HEPARIN SODIUM 5000 [USP'U]/ML
5000 INJECTION, SOLUTION INTRAVENOUS; SUBCUTANEOUS EVERY 8 HOURS
Status: DISCONTINUED | OUTPATIENT
Start: 2024-02-21 | End: 2024-02-22 | Stop reason: HOSPADM

## 2024-02-21 RX ORDER — ONDANSETRON HYDROCHLORIDE 2 MG/ML
4 INJECTION, SOLUTION INTRAVENOUS EVERY 8 HOURS PRN
Status: DISCONTINUED | OUTPATIENT
Start: 2024-02-21 | End: 2024-02-22 | Stop reason: HOSPADM

## 2024-02-21 RX ORDER — ONDANSETRON 4 MG/1
4 TABLET, ORALLY DISINTEGRATING ORAL EVERY 8 HOURS PRN
Status: DISCONTINUED | OUTPATIENT
Start: 2024-02-21 | End: 2024-02-22 | Stop reason: HOSPADM

## 2024-02-21 RX ORDER — PANTOPRAZOLE SODIUM 40 MG/10ML
80 INJECTION, POWDER, LYOPHILIZED, FOR SOLUTION INTRAVENOUS ONCE
Status: COMPLETED | OUTPATIENT
Start: 2024-02-21 | End: 2024-02-21

## 2024-02-21 RX ORDER — ACETAMINOPHEN 650 MG/1
650 SUPPOSITORY RECTAL EVERY 6 HOURS
Status: DISCONTINUED | OUTPATIENT
Start: 2024-02-21 | End: 2024-02-22 | Stop reason: HOSPADM

## 2024-02-21 RX ORDER — PANTOPRAZOLE SODIUM 40 MG/10ML
40 INJECTION, POWDER, LYOPHILIZED, FOR SOLUTION INTRAVENOUS DAILY
Status: DISCONTINUED | OUTPATIENT
Start: 2024-02-21 | End: 2024-02-22 | Stop reason: HOSPADM

## 2024-02-21 RX ORDER — SODIUM CHLORIDE, SODIUM LACTATE, POTASSIUM CHLORIDE, CALCIUM CHLORIDE 600; 310; 30; 20 MG/100ML; MG/100ML; MG/100ML; MG/100ML
100 INJECTION, SOLUTION INTRAVENOUS CONTINUOUS
Status: DISCONTINUED | OUTPATIENT
Start: 2024-02-21 | End: 2024-02-22 | Stop reason: HOSPADM

## 2024-02-21 RX ADMIN — PANTOPRAZOLE SODIUM 80 MG: 40 INJECTION, POWDER, FOR SOLUTION INTRAVENOUS at 14:27

## 2024-02-21 RX ADMIN — SODIUM CHLORIDE, POTASSIUM CHLORIDE, SODIUM LACTATE AND CALCIUM CHLORIDE 1000 ML: 600; 310; 30; 20 INJECTION, SOLUTION INTRAVENOUS at 14:26

## 2024-02-21 RX ADMIN — ACETAMINOPHEN 650 MG: 325 TABLET ORAL at 23:24

## 2024-02-21 RX ADMIN — FENTANYL CITRATE 50 MCG: 50 INJECTION INTRAMUSCULAR; INTRAVENOUS at 15:14

## 2024-02-21 RX ADMIN — HEPARIN SODIUM 5000 UNITS: 5000 INJECTION INTRAVENOUS; SUBCUTANEOUS at 23:24

## 2024-02-21 RX ADMIN — ONDANSETRON 4 MG: 2 INJECTION INTRAMUSCULAR; INTRAVENOUS at 15:13

## 2024-02-21 RX ADMIN — IOHEXOL 100 ML: 350 INJECTION, SOLUTION INTRAVENOUS at 14:13

## 2024-02-21 RX ADMIN — SODIUM CHLORIDE, POTASSIUM CHLORIDE, SODIUM LACTATE AND CALCIUM CHLORIDE 100 ML/HR: 600; 310; 30; 20 INJECTION, SOLUTION INTRAVENOUS at 17:50

## 2024-02-21 SDOH — SOCIAL STABILITY: SOCIAL INSECURITY: DOES ANYONE TRY TO KEEP YOU FROM HAVING/CONTACTING OTHER FRIENDS OR DOING THINGS OUTSIDE YOUR HOME?: NO

## 2024-02-21 SDOH — SOCIAL STABILITY: SOCIAL INSECURITY: DO YOU FEEL ANYONE HAS EXPLOITED OR TAKEN ADVANTAGE OF YOU FINANCIALLY OR OF YOUR PERSONAL PROPERTY?: NO

## 2024-02-21 SDOH — SOCIAL STABILITY: SOCIAL INSECURITY: ABUSE: ADULT

## 2024-02-21 SDOH — SOCIAL STABILITY: SOCIAL INSECURITY: HAVE YOU HAD THOUGHTS OF HARMING ANYONE ELSE?: NO

## 2024-02-21 SDOH — SOCIAL STABILITY: SOCIAL INSECURITY: WERE YOU ABLE TO COMPLETE ALL THE BEHAVIORAL HEALTH SCREENINGS?: YES

## 2024-02-21 SDOH — SOCIAL STABILITY: SOCIAL INSECURITY: ARE THERE ANY APPARENT SIGNS OF INJURIES/BEHAVIORS THAT COULD BE RELATED TO ABUSE/NEGLECT?: NO

## 2024-02-21 SDOH — SOCIAL STABILITY: SOCIAL INSECURITY: HAS ANYONE EVER THREATENED TO HURT YOUR FAMILY OR YOUR PETS?: NO

## 2024-02-21 SDOH — SOCIAL STABILITY: SOCIAL INSECURITY: ARE YOU OR HAVE YOU BEEN THREATENED OR ABUSED PHYSICALLY, EMOTIONALLY, OR SEXUALLY BY ANYONE?: NO

## 2024-02-21 SDOH — SOCIAL STABILITY: SOCIAL INSECURITY: DO YOU FEEL UNSAFE GOING BACK TO THE PLACE WHERE YOU ARE LIVING?: NO

## 2024-02-21 ASSESSMENT — COLUMBIA-SUICIDE SEVERITY RATING SCALE - C-SSRS
1. IN THE PAST MONTH, HAVE YOU WISHED YOU WERE DEAD OR WISHED YOU COULD GO TO SLEEP AND NOT WAKE UP?: NO
6. HAVE YOU EVER DONE ANYTHING, STARTED TO DO ANYTHING, OR PREPARED TO DO ANYTHING TO END YOUR LIFE?: NO
1. IN THE PAST MONTH, HAVE YOU WISHED YOU WERE DEAD OR WISHED YOU COULD GO TO SLEEP AND NOT WAKE UP?: NO
2. HAVE YOU ACTUALLY HAD ANY THOUGHTS OF KILLING YOURSELF?: NO
2. HAVE YOU ACTUALLY HAD ANY THOUGHTS OF KILLING YOURSELF?: NO
6. HAVE YOU EVER DONE ANYTHING, STARTED TO DO ANYTHING, OR PREPARED TO DO ANYTHING TO END YOUR LIFE?: NO

## 2024-02-21 ASSESSMENT — ACTIVITIES OF DAILY LIVING (ADL)
FEEDING YOURSELF: INDEPENDENT
DRESSING YOURSELF: INDEPENDENT
HEARING - RIGHT EAR: FUNCTIONAL
HEARING - LEFT EAR: FUNCTIONAL
ADEQUATE_TO_COMPLETE_ADL: YES
WALKS IN HOME: NEEDS ASSISTANCE
PATIENT'S MEMORY ADEQUATE TO SAFELY COMPLETE DAILY ACTIVITIES?: YES
GROOMING: INDEPENDENT
TOILETING: INDEPENDENT
JUDGMENT_ADEQUATE_SAFELY_COMPLETE_DAILY_ACTIVITIES: YES
BATHING: INDEPENDENT
LACK_OF_TRANSPORTATION: NO

## 2024-02-21 ASSESSMENT — ENCOUNTER SYMPTOMS
RHINORRHEA: 0
CHEST TIGHTNESS: 0
POLYPHAGIA: 0
APNEA: 0
HALLUCINATIONS: 0
VOMITING: 0
AGITATION: 0
FLANK PAIN: 0
LIGHT-HEADEDNESS: 0
FEVER: 0
POLYDIPSIA: 0
FACIAL SWELLING: 0
NUMBNESS: 0
NAUSEA: 0
PALPITATIONS: 0
SINUS PAIN: 0
CHILLS: 0
CONFUSION: 0
TREMORS: 0
DIFFICULTY URINATING: 0
COLOR CHANGE: 0
CHOKING: 0

## 2024-02-21 ASSESSMENT — COGNITIVE AND FUNCTIONAL STATUS - GENERAL
DAILY ACTIVITIY SCORE: 24
PATIENT BASELINE BEDBOUND: NO
DAILY ACTIVITIY SCORE: 24
MOBILITY SCORE: 19
TURNING FROM BACK TO SIDE WHILE IN FLAT BAD: A LITTLE
MOVING TO AND FROM BED TO CHAIR: A LITTLE
STANDING UP FROM CHAIR USING ARMS: A LITTLE
WALKING IN HOSPITAL ROOM: A LITTLE
CLIMB 3 TO 5 STEPS WITH RAILING: A LITTLE
PATIENT BASELINE BEDBOUND: NO
MOVING FROM LYING ON BACK TO SITTING ON SIDE OF FLAT BED WITH BEDRAILS: A LITTLE

## 2024-02-21 ASSESSMENT — LIFESTYLE VARIABLES
PRESCIPTION_ABUSE_PAST_12_MONTHS: NO
HOW OFTEN DO YOU HAVE A DRINK CONTAINING ALCOHOL: NEVER
SKIP TO QUESTIONS 9-10: 1
AUDIT-C TOTAL SCORE: 0
SUBSTANCE_ABUSE_PAST_12_MONTHS: NO
HOW MANY STANDARD DRINKS CONTAINING ALCOHOL DO YOU HAVE ON A TYPICAL DAY: PATIENT DOES NOT DRINK
AUDIT-C TOTAL SCORE: 0
HOW OFTEN DO YOU HAVE 6 OR MORE DRINKS ON ONE OCCASION: NEVER

## 2024-02-21 ASSESSMENT — PAIN SCALES - GENERAL
PAINLEVEL_OUTOF10: 5 - MODERATE PAIN
PAINLEVEL_OUTOF10: 5 - MODERATE PAIN

## 2024-02-21 ASSESSMENT — PAIN DESCRIPTION - LOCATION: LOCATION: ABDOMEN

## 2024-02-21 ASSESSMENT — PAIN DESCRIPTION - PAIN TYPE: TYPE: ACUTE PAIN

## 2024-02-21 ASSESSMENT — PAIN - FUNCTIONAL ASSESSMENT: PAIN_FUNCTIONAL_ASSESSMENT: 0-10

## 2024-02-21 NOTE — PROGRESS NOTES
Pharmacy Medication History Review    Soren Cheema is a 33 y.o. male admitted for GI bleeding. Pharmacy reviewed the patient's [id number] medications and allergies for accuracy.    The list below reflectives the updated PTA list. Please review each medication in order reconciliation for additional clarification and justification.  Prior to Admission medications    Medication Sig Start Date End Date Taking? Authorizing Provider   enoxaparin (Lovenox) 60 mg/0.6 mL syringe Inject 0.6 mL (60 mg) under the skin once daily for 28 days. 1/25/24 2/22/24 Yes Gary Reid MD   multivitamin-children's (Cerovite, Jr) chewable tablet Chew 2 tablets once daily.   Yes Historical Provider, MD   omeprazole (PriLOSEC) 40 mg DR capsule Take 1 capsule (40 mg) by mouth once daily in the morning. Take before meals. Do not crush or chew. Open capsule, sprinkle beads on SF jello, pudding or applesauce. 1/25/24 7/23/24 Yes Gary Reid MD   ondansetron ODT (Zofran-ODT) 4 mg disintegrating tablet Take 1 tablet (4 mg) by mouth every 6 hours if needed for nausea or vomiting. 1/25/24  Yes Gary Reid MD        The list below reflectives the updated allergy list. Please review each documented allergy for additional clarification and justification.  Allergies  Reviewed by Hermila Reza CPhT on 2/21/2024        Severity Reactions Comments    Shellfish Derived High Anaphylaxis             Below are additional concerns with the patient's PTA list.      Hermila Reza CPhT

## 2024-02-21 NOTE — ED PROVIDER NOTES
HPI   Chief Complaint   Patient presents with    Syncope       Patient is a 33-year-old male, medical history significant for gastric bypass surgery 14 days ago, presents to the emergency department after syncopal episode.  Patient states he was in his normal state of health.  Has been having abdominal pain since the surgery.  Today, he states that it got worse he had some cramping and moved his bowels.  He states when he moved his bowels, he noticed multiple bouts of maroon-colored stool.  He states he was getting nauseated, lightheaded, and then passed out.  He struck his head.  Patient is on Lovenox currently.  He denies fevers.  He denies chills or sweats.  Today was the first day he had any bleeding.                          [place] Coma Scale Score: 15                     Patient History   Past Medical History:   Diagnosis Date    Anxiety     Asthma     as a child    Depression     Metabolic syndrome     Morbid obesity (CMS/HCC)     Nephrolithiasis      Past Surgical History:   Procedure Laterality Date    BARIATRIC SURGERY  02/07/2024    miguelina-en-y gastric bypass    COLONOSCOPY       Family History   Problem Relation Name Age of Onset    Breast cancer Mother      Edema Father      Edema Sister       Social History     Tobacco Use    Smoking status: Never    Smokeless tobacco: Never   Vaping Use    Vaping Use: Never used   Substance Use Topics    Alcohol use: Not Currently    Drug use: Never       Physical Exam   ED Triage Vitals [02/21/24 1307]   Temperature Heart Rate Respirations BP   36.6 °C (97.9 °F) 89 20 108/62      Pulse Ox Temp Source Heart Rate Source Patient Position   98 % Tympanic Monitor Sitting      BP Location FiO2 (%)     Left arm --       Physical Exam  Vitals and nursing note reviewed.   Constitutional:       General: He is not in acute distress.     Appearance: He is well-developed.   HENT:      Head: Normocephalic and atraumatic.   Eyes:      Extraocular Movements: Extraocular movements  intact.      Conjunctiva/sclera: Conjunctivae normal.      Pupils: Pupils are equal, round, and reactive to light.   Cardiovascular:      Rate and Rhythm: Normal rate and regular rhythm.      Heart sounds: No murmur heard.  Pulmonary:      Effort: Pulmonary effort is normal. No respiratory distress.      Breath sounds: Normal breath sounds.   Abdominal:      General: Abdomen is flat.      Palpations: Abdomen is soft.      Tenderness: There is abdominal tenderness.   Musculoskeletal:         General: No swelling.      Cervical back: Normal range of motion and neck supple. No rigidity.   Skin:     General: Skin is warm and dry.      Capillary Refill: Capillary refill takes less than 2 seconds.   Neurological:      General: No focal deficit present.      Mental Status: He is alert and oriented to person, place, and time.   Psychiatric:         Mood and Affect: Mood normal.         ED Course & MDM   ED Course as of 02/21/24 1623   Wed Feb 21, 2024   1442 CBC unremarkable. [MK]   1457 CT head shows no acute intracranial process. [MK]   1510 Patient does have a mild elevation of the BUN consistent with his bleeding. [MK]   1510 Lactic acid is normal at 0.8. [MK]   1510 CT cervical spine negative for acute fracture. [MK]      ED Course User Index  [MK] Tato Barber MD         Diagnoses as of 02/21/24 1623   Syncope, unspecified syncope type   Gastrointestinal hemorrhage, unspecified gastrointestinal hemorrhage type       Medical Decision Making  Medical Decision Making: Patient was activated as an HIA as he is on therapeutic Lovenox.  He did strike his head.  However, the more concerning thing was that he had syncopal episode with rectal bleeding and recent gastric bypass.  On arrival, the patient is awake and alert.  He is not hypotensive or tachycardic.  EKG was obtained which did not show any evidence of acute ischemia.  Patient was sent immediately for CT imaging and I did discuss with gastric surgery fellow.  We  did obtain a CTA of the chest abdomen pelvis given recent surgery with report of bleeding.  There was no evidence of contrast extravasation.  Patient was given fluids.  Head CT and CT of the cervical spine were also negative.  He does have mild elevation of his BUN, but otherwise labs are unremarkable.  He is covered with Protonix.  He is given a small dose of analgesics.  Patient will be admitted observation under gastric surgery given bleeding.    EKG interpreted by myself (ED attending physician): EKG demonstrates sinus rhythm.  Rate of 84.  Normal axis.  Isolated T wave inversion in lead III.  No acute ischemia.  No STEMI.    Differential Diagnoses Considered: GI bleed, dehydration, syncope, dysrhythmia    Chronic Medical Conditions Significantly Affecting Care: Recent gastric bypass surgery    External Records Reviewed: I reviewed recent and relevant outside records including: Surgical op note    Independent Interpretation of Studies:  I independently interpreted: CT obtained reviewed    Escalation of Care:  Appropriate for observation    Social Determinants of Health Significantly Affecting Care:  No social determinants    Prescription Drug Consideration: IV fluids, IV Protonix, IV fentanyl    Diagnostic testing considered: Noncontributory    Discussion of Management with Other Providers:   I discussed the patient/results with: Gastric surgery who agrees with plan of care          Procedure  Critical Care    Performed by: Tato Barber MD  Authorized by: Tato Barber MD    Critical care provider statement:     Critical care time (minutes):  35    Critical care time was exclusive of:  Separately billable procedures and treating other patients and teaching time    Critical care was necessary to treat or prevent imminent or life-threatening deterioration of the following conditions:  Trauma    Critical care was time spent personally by me on the following activities:  Ordering and performing treatments  and interventions, ordering and review of laboratory studies, ordering and review of radiographic studies, re-evaluation of patient's condition, review of old charts, examination of patient, discussions with primary provider, discussions with consultants and evaluation of patient's response to treatment    Care discussed with: admitting provider         Tato Barber MD  02/21/24 8240

## 2024-02-21 NOTE — ED TRIAGE NOTES
Pt presents to ED after syncopal episode this morning. PT had a bowel movement with dark blood in stool. Shortly after, pt had a syncopal episode. Reports hitting head on bath tub. Pt on thinners. Pt post op 2 week after bariatric surgery.

## 2024-02-21 NOTE — H&P
History Of Present Illness  Soren Cheema is a 33 y.o. male presenting with a history of a RnY gastric bypass on 2/07 who called our office due to having bloody bms with dry heaves. The patient came to the ED for further evaluation. He states that he has been having some incisional pain in the RLQ and has been there since the surgery. He has been meeting his goals, overall patient denies fevers, chills and aside from today his nausea has been under control. Patient states that after the bloody bm he fell and hit his head. Overall now he feels much better      Past Medical History  Past Medical History:   Diagnosis Date    Anxiety     Asthma     as a child    Depression     Metabolic syndrome     Morbid obesity (CMS/HCC)     Nephrolithiasis        Surgical History  Past Surgical History:   Procedure Laterality Date    BARIATRIC SURGERY  02/07/2024    miguelina-en-y gastric bypass    COLONOSCOPY          Social History  He reports that he has never smoked. He has never used smokeless tobacco. He reports that he does not currently use alcohol. He reports that he does not use drugs.    Family History  Family History   Problem Relation Name Age of Onset    Breast cancer Mother      Edema Father      Edema Sister          Allergies  Shellfish derived    Review of Systems   Constitutional:  Negative for chills and fever.   HENT:  Negative for facial swelling, nosebleeds, rhinorrhea and sinus pain.    Respiratory:  Negative for apnea, choking and chest tightness.    Cardiovascular:  Negative for chest pain, palpitations and leg swelling.   Gastrointestinal:  Negative for nausea and vomiting.   Endocrine: Negative for polydipsia, polyphagia and polyuria.   Genitourinary:  Negative for difficulty urinating, flank pain and urgency.   Skin:  Negative for color change, pallor and rash.   Neurological:  Negative for tremors, light-headedness and numbness.   Psychiatric/Behavioral:  Negative for agitation, behavioral problems, confusion,  "hallucinations and self-injury.         Physical Exam  Constitutional:       General: He is not in acute distress.     Appearance: Normal appearance.   HENT:      Head: Normocephalic and atraumatic.      Nose: Nose normal.      Mouth/Throat:      Pharynx: Oropharynx is clear.   Cardiovascular:      Rate and Rhythm: Normal rate and regular rhythm.   Pulmonary:      Effort: Pulmonary effort is normal.      Breath sounds: No stridor. No wheezing.   Abdominal:      General: Abdomen is flat. There is no distension.      Palpations: Abdomen is soft.      Tenderness: There is no abdominal tenderness.      Comments: Incision site clean dry and intact    Musculoskeletal:         General: No swelling, tenderness or deformity.      Cervical back: Normal range of motion and neck supple.   Skin:     General: Skin is warm and dry.   Neurological:      General: No focal deficit present.      Mental Status: He is alert and oriented to person, place, and time. Mental status is at baseline.   Psychiatric:         Mood and Affect: Mood normal.         Behavior: Behavior normal.         Judgment: Judgment normal.          Last Recorded Vitals  Blood pressure 115/67, pulse 71, temperature 36.6 °C (97.9 °F), temperature source Tympanic, resp. rate 18, height 1.753 m (5' 9\"), weight 127 kg (280 lb), SpO2 99 %.    Relevant Results        === 02/21/24 ===    CT ANGIO CHEST ABDOMEN PELVIS    - Impression -  No thoracic or abdominal aortic aneurysm or acute aortic pathology.    No acute abnormality of the chest, abdomen or pelvis.    Postsurgical changes of gastric bypass.    Signed by: Froy Mathis 2/21/2024 3:38 PM  Dictation workstation:   [id number]       Assessment/Plan   Principal Problem:    GI bleeding      Patient is a 34 y/o male who presented to the ED with bloody bms     Plan   Since in the ED he has not had any further bloody bms   Hg stable   Will admit to observation   No pain meds necessary   Will fu am labs        I spent 35 " minutes in the professional and overall care of this patient.      Gary Reid MD

## 2024-02-22 VITALS
RESPIRATION RATE: 20 BRPM | BODY MASS INDEX: 41.47 KG/M2 | TEMPERATURE: 97.7 F | HEIGHT: 69 IN | HEART RATE: 56 BPM | OXYGEN SATURATION: 95 % | WEIGHT: 280 LBS | DIASTOLIC BLOOD PRESSURE: 57 MMHG | SYSTOLIC BLOOD PRESSURE: 118 MMHG

## 2024-02-22 PROBLEM — K92.2 GI BLEEDING: Status: RESOLVED | Noted: 2024-02-21 | Resolved: 2024-02-22

## 2024-02-22 LAB
ALBUMIN SERPL BCP-MCNC: 3.6 G/DL (ref 3.4–5)
ALP SERPL-CCNC: 44 U/L (ref 33–120)
ALT SERPL W P-5'-P-CCNC: 26 U/L (ref 10–52)
ANION GAP SERPL CALC-SCNC: 13 MMOL/L (ref 10–20)
AST SERPL W P-5'-P-CCNC: 16 U/L (ref 9–39)
BASOPHILS # BLD AUTO: 0.07 X10*3/UL (ref 0–0.1)
BASOPHILS NFR BLD AUTO: 1.1 %
BILIRUB SERPL-MCNC: 0.5 MG/DL (ref 0–1.2)
BUN SERPL-MCNC: 22 MG/DL (ref 6–23)
CALCIUM SERPL-MCNC: 9.2 MG/DL (ref 8.6–10.3)
CHLORIDE SERPL-SCNC: 102 MMOL/L (ref 98–107)
CO2 SERPL-SCNC: 26 MMOL/L (ref 21–32)
CREAT SERPL-MCNC: 0.71 MG/DL (ref 0.5–1.3)
EGFRCR SERPLBLD CKD-EPI 2021: >90 ML/MIN/1.73M*2
EOSINOPHIL # BLD AUTO: 0.29 X10*3/UL (ref 0–0.7)
EOSINOPHIL NFR BLD AUTO: 4.6 %
ERYTHROCYTE [DISTWIDTH] IN BLOOD BY AUTOMATED COUNT: 12.8 % (ref 11.5–14.5)
ERYTHROCYTE [DISTWIDTH] IN BLOOD BY AUTOMATED COUNT: 12.9 % (ref 11.5–14.5)
GLUCOSE SERPL-MCNC: 82 MG/DL (ref 74–99)
HCT VFR BLD AUTO: 35.1 % (ref 41–52)
HCT VFR BLD AUTO: 35.2 % (ref 41–52)
HGB BLD-MCNC: 11.9 G/DL (ref 13.5–17.5)
HGB BLD-MCNC: 12.1 G/DL (ref 13.5–17.5)
IMM GRANULOCYTES # BLD AUTO: 0.02 X10*3/UL (ref 0–0.7)
IMM GRANULOCYTES NFR BLD AUTO: 0.3 % (ref 0–0.9)
LYMPHOCYTES # BLD AUTO: 2.12 X10*3/UL (ref 1.2–4.8)
LYMPHOCYTES NFR BLD AUTO: 33.4 %
MCH RBC QN AUTO: 29 PG (ref 26–34)
MCH RBC QN AUTO: 29.3 PG (ref 26–34)
MCHC RBC AUTO-ENTMCNC: 33.8 G/DL (ref 32–36)
MCHC RBC AUTO-ENTMCNC: 34.5 G/DL (ref 32–36)
MCV RBC AUTO: 85 FL (ref 80–100)
MCV RBC AUTO: 86 FL (ref 80–100)
MONOCYTES # BLD AUTO: 0.72 X10*3/UL (ref 0.1–1)
MONOCYTES NFR BLD AUTO: 11.4 %
NEUTROPHILS # BLD AUTO: 3.12 X10*3/UL (ref 1.2–7.7)
NEUTROPHILS NFR BLD AUTO: 49.2 %
NRBC BLD-RTO: 0 /100 WBCS (ref 0–0)
NRBC BLD-RTO: 0 /100 WBCS (ref 0–0)
PLATELET # BLD AUTO: 389 X10*3/UL (ref 150–450)
PLATELET # BLD AUTO: 393 X10*3/UL (ref 150–450)
POTASSIUM SERPL-SCNC: 4 MMOL/L (ref 3.5–5.3)
PROT SERPL-MCNC: 6.2 G/DL (ref 6.4–8.2)
RBC # BLD AUTO: 4.1 X10*6/UL (ref 4.5–5.9)
RBC # BLD AUTO: 4.13 X10*6/UL (ref 4.5–5.9)
SODIUM SERPL-SCNC: 137 MMOL/L (ref 136–145)
WBC # BLD AUTO: 4.4 X10*3/UL (ref 4.4–11.3)
WBC # BLD AUTO: 6.3 X10*3/UL (ref 4.4–11.3)

## 2024-02-22 PROCEDURE — 96376 TX/PRO/DX INJ SAME DRUG ADON: CPT

## 2024-02-22 PROCEDURE — C9113 INJ PANTOPRAZOLE SODIUM, VIA: HCPCS | Performed by: STUDENT IN AN ORGANIZED HEALTH CARE EDUCATION/TRAINING PROGRAM

## 2024-02-22 PROCEDURE — 80053 COMPREHEN METABOLIC PANEL: CPT | Performed by: STUDENT IN AN ORGANIZED HEALTH CARE EDUCATION/TRAINING PROGRAM

## 2024-02-22 PROCEDURE — 36415 COLL VENOUS BLD VENIPUNCTURE: CPT | Performed by: STUDENT IN AN ORGANIZED HEALTH CARE EDUCATION/TRAINING PROGRAM

## 2024-02-22 PROCEDURE — G0378 HOSPITAL OBSERVATION PER HR: HCPCS

## 2024-02-22 PROCEDURE — 85025 COMPLETE CBC W/AUTO DIFF WBC: CPT | Performed by: STUDENT IN AN ORGANIZED HEALTH CARE EDUCATION/TRAINING PROGRAM

## 2024-02-22 PROCEDURE — 2500000004 HC RX 250 GENERAL PHARMACY W/ HCPCS (ALT 636 FOR OP/ED): Performed by: STUDENT IN AN ORGANIZED HEALTH CARE EDUCATION/TRAINING PROGRAM

## 2024-02-22 PROCEDURE — 85027 COMPLETE CBC AUTOMATED: CPT | Mod: 59 | Performed by: STUDENT IN AN ORGANIZED HEALTH CARE EDUCATION/TRAINING PROGRAM

## 2024-02-22 RX ADMIN — ACETAMINOPHEN 650 MG: 325 TABLET ORAL at 13:16

## 2024-02-22 RX ADMIN — ACETAMINOPHEN 650 MG: 325 TABLET ORAL at 06:42

## 2024-02-22 RX ADMIN — PANTOPRAZOLE SODIUM 40 MG: 40 INJECTION, POWDER, FOR SOLUTION INTRAVENOUS at 09:18

## 2024-02-22 RX ADMIN — SODIUM CHLORIDE, POTASSIUM CHLORIDE, SODIUM LACTATE AND CALCIUM CHLORIDE 100 ML/HR: 600; 310; 30; 20 INJECTION, SOLUTION INTRAVENOUS at 03:38

## 2024-02-22 RX ADMIN — SODIUM CHLORIDE, POTASSIUM CHLORIDE, SODIUM LACTATE AND CALCIUM CHLORIDE 100 ML/HR: 600; 310; 30; 20 INJECTION, SOLUTION INTRAVENOUS at 13:16

## 2024-02-22 RX ADMIN — HEPARIN SODIUM 5000 UNITS: 5000 INJECTION INTRAVENOUS; SUBCUTANEOUS at 09:18

## 2024-02-22 ASSESSMENT — PAIN SCALES - GENERAL: PAINLEVEL_OUTOF10: 0 - NO PAIN

## 2024-02-22 NOTE — DISCHARGE SUMMARY
Discharge Diagnosis  GI bleeding    Issues Requiring Follow-Up  GI bleeding after bypass     Test Results Pending At Discharge  Pending Labs       No current pending labs.            Hospital Course   Patient is a 34 yo male s/p RnY gastric bypass, was on lovenox and had a UGI bleed that he came to the ED for evaluation for. Patient now doing well. His Hg is stable and was discharged the following day in stable condition.     Pertinent Physical Exam At Time of Discharge  Physical Exam  Constitutional:       General: He is not in acute distress.     Appearance: Normal appearance.   HENT:      Head: Normocephalic and atraumatic.      Nose: Nose normal.      Mouth/Throat:      Pharynx: Oropharynx is clear.   Cardiovascular:      Rate and Rhythm: Normal rate and regular rhythm.   Pulmonary:      Effort: Pulmonary effort is normal.      Breath sounds: No stridor. No wheezing.   Abdominal:      General: Abdomen is flat. There is no distension.      Palpations: Abdomen is soft.      Tenderness: There is no abdominal tenderness.   Musculoskeletal:         General: No swelling, tenderness or deformity.      Cervical back: Normal range of motion and neck supple.   Skin:     General: Skin is warm and dry.   Neurological:      General: No focal deficit present.      Mental Status: He is alert and oriented to person, place, and time. Mental status is at baseline.   Psychiatric:         Mood and Affect: Mood normal.         Behavior: Behavior normal.         Judgment: Judgment normal.         Home Medications     Medication List      CONTINUE taking these medications     multivitamin-children's chewable tablet; Commonly known as: Cerovite, Jr   omeprazole 40 mg DR capsule; Commonly known as: PriLOSEC; Take 1 capsule   (40 mg) by mouth once daily in the morning. Take before meals. Do not   crush or chew. Open capsule, sprinkle beads on SF jello, pudding or   applesauce.   ondansetron ODT 4 mg disintegrating tablet; Commonly known  as:   Zofran-ODT; Take 1 tablet (4 mg) by mouth every 6 hours if needed for   nausea or vomiting.     STOP taking these medications     enoxaparin 60 mg/0.6 mL syringe; Commonly known as: Lovenox       Outpatient Follow-Up  Future Appointments   Date Time Provider Department Center   3/18/2024 11:00 AM IHSAN Zarate [id number] [facility]   3/18/2024  2:00 PM Jessica Jessica RDN, MAURICIO [id number] [place]   4/29/2024  8:45 AM IHSAN Zarate [id number] [facility]   4/29/2024  1:30 PM Jessica Jessica RDN, MAURICIO [id number] [place]       Gary Reid MD

## 2024-02-22 NOTE — NURSING NOTE
Pt discharged home at this time with wife. No pain or discomfort noted at this time. No bloody stool noted on this shift. Discharge paperwork reviewed at the bedside.

## 2024-02-22 NOTE — PROGRESS NOTES
2/22/2024  Met with pt in room, introduced self and explained role. Verified insurance, address, phone,  PCP- Dr Jae Castelan  Pt is from home, lives with wife. Stated he is independent. No use of any assistive devices. Performs own ADL's.  Drives. Does not feel he will have any needs upon discharge.  Brenda Maza Rn [facility]

## 2024-02-24 LAB
ATRIAL RATE: 84 BPM
P AXIS: 49 DEGREES
PR INTERVAL: 142 MS
Q ONSET: 252 MS
QRS COUNT: 13 BEATS
QRS DURATION: 93 MS
QT INTERVAL: 349 MS
QTC CALCULATION(BAZETT): 413 MS
QTC FREDERICIA: 390 MS
R AXIS: 86 DEGREES
T AXIS: 28 DEGREES
T OFFSET: 426 MS
VENTRICULAR RATE: 84 BPM

## 2024-03-01 ENCOUNTER — TELEPHONE (OUTPATIENT)
Dept: SURGERY | Facility: CLINIC | Age: 34
End: 2024-03-01
Payer: MEDICAID

## 2024-03-01 NOTE — TELEPHONE ENCOUNTER
Called pt to review soft food diet to start on 3/4  Tolerating puree well  Pt is drinking 56 oz fluid daily   Pt is drinking 60 g protein daily  Advised to read over pages 15-17 in nutrition guidelines.   Jessica

## 2024-03-17 PROBLEM — K91.2 POSTOPERATIVE MALABSORPTION (HHS-HCC): Status: ACTIVE | Noted: 2024-03-17

## 2024-03-17 NOTE — ASSESSMENT & PLAN NOTE
- discussed practices to promote adequate sleep hygiene  - continue to follow with sleep medicine as needed.

## 2024-03-17 NOTE — ASSESSMENT & PLAN NOTE
- weight loss has been steady s/p RYGB. Energy and sleep improving.  - patient recovering well from prior ED visit.   - achieving full protein and fluids. Counseled on need for increased hydration.

## 2024-03-17 NOTE — PROGRESS NOTES
BARIATRIC SURGERY CLINIC  Comprehensive Weight Management        Patient: Soren Cheema   MRN: [id number]     Index Surgery:  Date: 02/07/2024  Surgeon: Dr. Berg  Type: RYGB    Virtual or Telephone Consent:  A telephone visit (audio or visual only) between the patient (at the originating site) and the provider (at the distant site) was utilized to provide this telehealth service. Verbal consent was requested and obtained from Soren Cheema on this date, 03/18/2024 [id number] for a telehealth visit.     HPI   Soren Cheema is a 33 y.o. male presenting for routine 6 week post-op apt s/p RYGB 2/07/2024. Initially uncomplicated post-op course. Presented to ED then 2/22/24 for possible GI bleed/dehydration after an episode of syncope at home. Determined to have passed clot from blood thinner, no longer on lovenox . Imaging was normal, discharged 2/23 and no further issues.    Diet:  - soft food diet, started 3/4  - some issues with regurgitation, reinforced need to eat slowly, chew thoroughly, eat smaller more frequent meals. Wants to increase dietary protein. Discussed working off shakes, he has follow up with RD today to further advise diet advancement / modification.   - Recall: B- protein shake, L- eggs and cheese D- soup S- yogurt    Exercise:  - walking, increasing activity as tolerated.    Concerns related to:  Nausea/Vomiting, Reflux: denies  Regurgitation- yes, with trying new foods, advancing diet. Provided patient reassurance. Discussed imaging from hospital. He is overall doing well but fears the regurgitation, that something is wrong. Spoke to Dr. Berg yest after issue of katherine from soup feeling 'stuck'. No other issues.   Abdominal Pain: denies  Diarrhea/Constipation- has some regular BM but constipation persists. Recommend milk of mag daily for now. Discussed adding fiber supplement, increasing dietary fiber and fluids.     Daily Supplements:  Calcium: Calcium Citrate w/ vitamin D (1200 - 1500mg)  Multivitamin &  Minerals: 2 per day  Vitamin B12: 500 mcg/day   Vitamin D3: 3000 units   Iron/Other: iron supplement   PPI: taking    Primary Medical Hx:  Patient Active Problem List   Diagnosis    Chronic midline low back pain with right-sided sciatica    Metabolic syndrome    Obesity, Class III, BMI 40-49.9 (morbid obesity) (CMS/HCC)    Vitamin D deficiency    S/P gastric bypass    RADHIKA (obstructive sleep apnea)    Postoperative malabsorption      Past Surgical History:   Procedure Laterality Date    BARIATRIC SURGERY  02/07/2024    miguelina-en-y gastric bypass    COLONOSCOPY        Social History     Socioeconomic History    Marital status:      Spouse name: Not on file    Number of children: Not on file    Years of education: Not on file    Highest education level: Not on file   Occupational History    Not on file   Tobacco Use    Smoking status: Never    Smokeless tobacco: Never   Vaping Use    Vaping Use: Never used   Substance and Sexual Activity    Alcohol use: Not Currently    Drug use: Never    Sexual activity: Defer   Other Topics Concern    Not on file   Social History Narrative    Not on file     Social Determinants of Health     Financial Resource Strain: Low Risk  (2/21/2024)    Overall Financial Resource Strain (CARDIA)     Difficulty of Paying Living Expenses: Not very hard   Food Insecurity: Not on file   Transportation Needs: No Transportation Needs (2/21/2024)    PRAPARE - Transportation     Lack of Transportation (Medical): No     Lack of Transportation (Non-Medical): No   Physical Activity: Not on file   Stress: Not on file   Social Connections: Not on file   Intimate Partner Violence: Not on file   Housing Stability: Low Risk  (2/21/2024)    Housing Stability Vital Sign     Unable to Pay for Housing in the Last Year: No     Number of Places Lived in the Last Year: 1     Unstable Housing in the Last Year: No     Family History   Problem Relation Name Age of Onset    Breast cancer Mother      Edema Father    "   Edema Sister        Current Outpatient Medications on File Prior to Visit   Medication Sig Dispense Refill    multivitamin-children's (Cerovite, Jr) chewable tablet Chew 2 tablets once daily.      omeprazole (PriLOSEC) 40 mg DR capsule Take 1 capsule (40 mg) by mouth once daily in the morning. Take before meals. Do not crush or chew. Open capsule, sprinkle beads on SF jello, pudding or applesauce. 90 capsule 1    [DISCONTINUED] ondansetron ODT (Zofran-ODT) 4 mg disintegrating tablet Take 1 tablet (4 mg) by mouth every 6 hours if needed for nausea or vomiting. 60 tablet 1     No current facility-administered medications on file prior to visit.      Allergies   Allergen Reactions    Shellfish Derived Anaphylaxis     REVIEW OF SYSTEMS:  Negative unless otherwise reviewed in HPI.    PHYSICAL EXAM   Physical Exam   Ht: 5'9\"  Wt: 264 lbs BMI: 38.99  General- No acute distress, well appearing and well nourished.   Eyes Conjunctiva and lids: No erythema, swelling or discharge  Neck appears supple  CV: self-reports reg rate / rhythm   Pulmonary: Respiratory effort: Normal respiration. unlabored  Abdomen: Central adiposity  Neurologic - Coordination: Normal gait   Extremities: No clubbing, cyanosis  Psychiatric - Orientation to person, place, and time: Normal. Mood and affect: Normal.    ASSESSMENT & PLAN  33 y.o. male presenting for 6 week post-op visit s/p rygb.     Weight Impression  -Initial Weight: 313 lbs  -Today's Weight: 264 lbs  -%Total Weight Loss: -15.65%    Problem List Items Addressed This Visit       Metabolic syndrome     - Reviewed impact of diet, physical activity, stress & sleep to cardio metabolic health risks and ways to modify current lifestyle to reduce cardiometabolic health risks associated with obesity.  - Counseled on benefits of increased regular exercise, both aerobic & resistance training.  Reviewed benefits of resistance training to enhance/maintain lean body mass.  - Discussed dietary " modifications to support weight reduction, reduced calorie diet, encourage adequate protein, increased dietary fiber from fruit and vegetable to improve appetite/satiety regulation and quality of diet.  Discussed impact of processed foods and liquid calories to weight gain & contribution to dysfunctional appetite signals.          Obesity, Class III, BMI 40-49.9 (morbid obesity) (CMS/[facility])     - weight loss has been steady s/p RYGB. Energy and sleep improving.  - patient recovering well from prior ED visit.   - achieving full protein and fluids. Counseled on need for increased hydration.          Vitamin D deficiency     - taking supplements  - labs, see orders           S/P gastric bypass     - six weeks s/p RYGB  -Patient is doing well, some issues with regurgitation of new foods with advancing diet.   -reinforced need to chew thoroughly, eat slow, smaller frequent meals  - continue prioritizing protein and fluids  - no acute issues   takes supplements   does regular exercise  no pain or GERD or nausea. Incisions healed. Energy improving with steady weight loss.     Recs:   doing well  No acute issues since ED discharge   advised to continue Diet, exercise,  FU with dietitian, discuss advancing diet  continue vitamin supplementation   join support groups  will check labs  FU 6 weeks for 3 month post-op visit, labs- see orders          RADHIKA (obstructive sleep apnea)     - discussed practices to promote adequate sleep hygiene  - continue to follow with sleep medicine as needed.         Postoperative malabsorption - Primary     - taking appropriate vitamins and supplements s/p bariatric surgery  - lab orders placed for 3 mo fuv to re-evaluate micronutrients           Please see Patient Instructions for today's relevant referrals, orders and instructions.  > 50% of time spent counseling on lifestyle modifications to support weight loss.      Follow Up: Follow up in about 3 months (around 6/18/2024).     Neha DE LEON  JUAN Garcia-CNP

## 2024-03-17 NOTE — ASSESSMENT & PLAN NOTE
- taking appropriate vitamins and supplements s/p bariatric surgery  - lab orders placed for 3 mo fuv to re-evaluate micronutrients

## 2024-03-17 NOTE — ASSESSMENT & PLAN NOTE
- Reviewed impact of diet, physical activity, stress & sleep to cardio metabolic health risks and ways to modify current lifestyle to reduce cardiometabolic health risks associated with obesity.  - Counseled on benefits of increased regular exercise, both aerobic & resistance training.  Reviewed benefits of resistance training to enhance/maintain lean body mass.  - Discussed dietary modifications to support weight reduction, reduced calorie diet, encourage adequate protein, increased dietary fiber from fruit and vegetable to improve appetite/satiety regulation and quality of diet.  Discussed impact of processed foods and liquid calories to weight gain & contribution to dysfunctional appetite signals.

## 2024-03-17 NOTE — ASSESSMENT & PLAN NOTE
- six weeks s/p RYGB  -Patient is doing well, some issues with regurgitation of new foods with advancing diet.   -reinforced need to chew thoroughly, eat slow, smaller frequent meals  - continue prioritizing protein and fluids  - no acute issues   takes supplements   does regular exercise  no pain or GERD or nausea. Incisions healed. Energy improving with steady weight loss.     Recs:   doing well  No acute issues since ED discharge   advised to continue Diet, exercise,  FU with dietitian, discuss advancing diet  continue vitamin supplementation   join support groups  will check labs  FU 6 weeks for 3 month post-op visit, labs- see orders

## 2024-03-17 NOTE — PATIENT INSTRUCTIONS
Remember to continue to track your nutrition intake, ensure you are getting 60g protein and 64 oz fluid daily  Do not eat and drink at the same time.   Follow up with your bariatric dietitian as much as you need for support with dietary advancement.     I encourage you to begin increasing duration and intensity of exercise.    Goal > 200 minutes per week of aerobic exercise - (this can be achieved by increasing exercise to at least 45 minutes 5 or more days per week of activities like brisk walking, jogging, elliptical, biking, swimming, etc)     Take your omeprazole, open the capsule and sprinkle onto sugar free applesauce, pudding, etc. Do not swallow whole.  Take your Multivitamin twice daily, B12 daily and calcium tablets 1200-1500mg/day.    Be sure you are following up with your primary care provider to discuss your other medications and how to take after surgery.  Medications must be smaller than the size of an M&M - please discuss with your primary prescribing provider alternative options (liquids, chewable options, or if your medicines can be cut in half) if medication size is larger than this.      Follow-up in 6 weeks for your 3 mo post op visit.

## 2024-03-18 ENCOUNTER — APPOINTMENT (OUTPATIENT)
Dept: SURGERY | Facility: CLINIC | Age: 34
End: 2024-03-18
Payer: MEDICAID

## 2024-03-18 ENCOUNTER — OFFICE VISIT (OUTPATIENT)
Dept: SURGERY | Facility: CLINIC | Age: 34
End: 2024-03-18
Payer: MEDICAID

## 2024-03-18 ENCOUNTER — NUTRITION (OUTPATIENT)
Dept: SURGERY | Facility: CLINIC | Age: 34
End: 2024-03-18
Payer: MEDICAID

## 2024-03-18 VITALS — WEIGHT: 264 LBS | HEIGHT: 69 IN | BODY MASS INDEX: 39.1 KG/M2

## 2024-03-18 DIAGNOSIS — E66.01 OBESITY, CLASS III, BMI 40-49.9 (MORBID OBESITY) (MULTI): ICD-10-CM

## 2024-03-18 DIAGNOSIS — Z98.84 S/P GASTRIC BYPASS: ICD-10-CM

## 2024-03-18 DIAGNOSIS — G47.33 OSA (OBSTRUCTIVE SLEEP APNEA): ICD-10-CM

## 2024-03-18 DIAGNOSIS — E88.810 METABOLIC SYNDROME: ICD-10-CM

## 2024-03-18 DIAGNOSIS — K91.2 POSTOPERATIVE MALABSORPTION (HHS-HCC): Primary | ICD-10-CM

## 2024-03-18 DIAGNOSIS — E55.9 VITAMIN D DEFICIENCY: ICD-10-CM

## 2024-03-18 PROCEDURE — 1036F TOBACCO NON-USER: CPT

## 2024-03-18 PROCEDURE — 3008F BODY MASS INDEX DOCD: CPT

## 2024-03-18 PROCEDURE — 99024 POSTOP FOLLOW-UP VISIT: CPT

## 2024-03-18 NOTE — PROGRESS NOTES
Surgery Date:  2/7/24  Surgeon:  Morena  Procedure:  gastric bypass    ASSESSMENT:  Current weight pounds:    264.0               Ht:  69.0     in.        BMI:  39.0   Previous weight pounds:   286.0  2/15/24  Initial start weight pounds:    338.0   6/8/23  EBW pounds:  174.0   Total weight change pounds:   74.0  %EBW Lost:  42.5%    PROGRESS:  Nutrition Interventions for last encounter (date):   1.          Continue to drink your protein shakes to meet your goal of 60-70 g of protein per day. Begin measuring how much protein you can eat on the soft diet so that you know when to start weaning off of your protein shakes.   2.          Continue to drink 64 oz. of zero calorie beverages per day  3.          Begin  no drinking 30 min before, during the meal and for 30 minutes after the meal when you start the puree diet on  2/19  4.          Continue to exercise   5.          Advance to the puree diet on 2/19 or 2 weeks, then soft food  on 3/4 for 2 weeks.  if you do not tolerate the puree diet, go back to the liquid diet for a few more days and then try puree again.  See pages 12-14 in your nutrtion guidelines booklet in your gray folder for more details on puree food.   Try only the pureed  foods on page 14.  I will call you 3/1 to review the soft diet before you start it.   6.          Continue to take all of your vitamins and minerals.   Remember to take a total of 3 calcium chews daily.   7.          Attend monthly support groups       CHANGES IN TREATMENT:   Patient met goals: Yes     24 hour food recall:   Breakfast:   protein shake  30 g pro   Snack:    2 spoons of yogurt to take omeprazole   Lunch:   1/2  egg   3 g   Snack:   none  Dinner:  1 oz   marilou steak,  Snack:   none  Beverages:    2 protein shakes and 50 oz water  Alcohol:   none    Vitamins:    2 Flintstones, 1000 mg calcium citrate Bariatric Fusion, B12 500 mcg   Physical Activity:  walking from ADL, some walking outside     READINESS TO  LEARN:  Motivation to learn:           Interested      Understanding of instruction: Good         Anticipated Compliance: Good       Family Support: Unable to assess-family not present     Patient presents with post-op weight loss surgery gastric bypass.   Pt is 6 weeks postop. Patient has lost 74.0 pounds since initial assessment accounting for 42.5% loss excess body weight.  Tolerating a soft  diet with some  difficulty.  Protein intake is  adequate for post-op individual. Fluid consumption is  adequate. Patient is supplementing recommended vitamin/minerals. Pt states concerns/difficulties with constipation and early satiety.  He is not able to eat a lot at one time, only a couple of bites.  He started taking MOM which helped.  He was advised to start taking 1 dose every day.   Also suggested stopping calcium for a few a days since calcium can be constipating.  There is also 650 mg calcium in his protein shakes.  Pt also had tried to eat won ton soup and ate part of a won ton dumping which he did not tolerate.  Strongly advised to follow instructions in his nutrition guidelines booklet.  Reminded pt that doughy foods are not allowed until he is 3 months postop.     Malnutrition Screening  Significant unintentional weight loss? n/a  Eating less than 75% of usual intake for more than 2 weeks? n/a    Nutrition Diagnosis:   1. Increased protein and nutrition needs related to altered GI function as evidenced by pt. s/p gastric bypass.  2. Food- and nutrition-related knowledge deficit related to lack of prior exposure to surgical weight loss information as evidenced by diet recall.     Nutrition Interventions:   1. Modify type and amount of food and nutrients within meals and snacks.  2. Comprehensive Nutrition Education  -Nutrition education materials: SG schedule        Recommendations:    1. Keep working on eating high protein foods.  Remember to chew them well and eat slowly.  Continue to drink 1-2 protein shakes to  help you meet your protein goal.   2. Continue to drink 64 oz. of zero calorie beverages per day  3. Continue no drinking 30 min before, during the meal and for 30 minutes after the meal  4. Increase intensity and duration of exercise  5. Advance to transition diet. Try raw veggies, fresh fruit and lean ground beef.   6. Eat slowly, chew thoroughly  7. Try one new food at a time.   8. Try stopping your calcium for a few days to see if that help relieve the constipation.   9.          Attend monthly support groups    Nutrition Monitoring and Evaluation:   1-2 pounds weight loss per week  Criteria: weight check, food recall  Need for Follow-up: 3 months      Jessica MARTÍNEZ, [facility]  Bariatric Surgery Dietitian  Phone: [phone]  Fax: [phone]

## 2024-03-20 ENCOUNTER — HOSPITAL ENCOUNTER (EMERGENCY)
Facility: HOSPITAL | Age: 34
Discharge: HOME | End: 2024-03-20
Attending: EMERGENCY MEDICINE
Payer: MEDICAID

## 2024-03-20 ENCOUNTER — APPOINTMENT (OUTPATIENT)
Dept: RADIOLOGY | Facility: HOSPITAL | Age: 34
End: 2024-03-20
Payer: MEDICAID

## 2024-03-20 ENCOUNTER — APPOINTMENT (OUTPATIENT)
Dept: CARDIOLOGY | Facility: HOSPITAL | Age: 34
End: 2024-03-20
Payer: MEDICAID

## 2024-03-20 VITALS
TEMPERATURE: 97.5 F | BODY MASS INDEX: 39.25 KG/M2 | HEIGHT: 69 IN | DIASTOLIC BLOOD PRESSURE: 58 MMHG | WEIGHT: 265 LBS | SYSTOLIC BLOOD PRESSURE: 122 MMHG | OXYGEN SATURATION: 99 % | RESPIRATION RATE: 18 BRPM | HEART RATE: 58 BPM

## 2024-03-20 DIAGNOSIS — R11.2 NAUSEA AND VOMITING, UNSPECIFIED VOMITING TYPE: Primary | ICD-10-CM

## 2024-03-20 DIAGNOSIS — E86.0 DEHYDRATION: ICD-10-CM

## 2024-03-20 LAB
ABO GROUP (TYPE) IN BLOOD: NORMAL
ALBUMIN SERPL BCP-MCNC: 4.5 G/DL (ref 3.4–5)
ALP SERPL-CCNC: 71 U/L (ref 33–120)
ALT SERPL W P-5'-P-CCNC: 30 U/L (ref 10–52)
ANION GAP SERPL CALC-SCNC: 15 MMOL/L (ref 10–20)
ANTIBODY SCREEN: NORMAL
APPEARANCE UR: CLEAR
AST SERPL W P-5'-P-CCNC: 18 U/L (ref 9–39)
BASOPHILS # BLD AUTO: 0.04 X10*3/UL (ref 0–0.1)
BASOPHILS NFR BLD AUTO: 0.4 %
BILIRUB SERPL-MCNC: 0.6 MG/DL (ref 0–1.2)
BILIRUB UR STRIP.AUTO-MCNC: NEGATIVE MG/DL
BUN SERPL-MCNC: 14 MG/DL (ref 6–23)
CALCIUM SERPL-MCNC: 10 MG/DL (ref 8.6–10.3)
CHLORIDE SERPL-SCNC: 101 MMOL/L (ref 98–107)
CO2 SERPL-SCNC: 25 MMOL/L (ref 21–32)
COLOR UR: YELLOW
CREAT SERPL-MCNC: 0.65 MG/DL (ref 0.5–1.3)
EGFRCR SERPLBLD CKD-EPI 2021: >90 ML/MIN/1.73M*2
EOSINOPHIL # BLD AUTO: 0.17 X10*3/UL (ref 0–0.7)
EOSINOPHIL NFR BLD AUTO: 1.8 %
ERYTHROCYTE [DISTWIDTH] IN BLOOD BY AUTOMATED COUNT: 13.7 % (ref 11.5–14.5)
GLUCOSE SERPL-MCNC: 85 MG/DL (ref 74–99)
GLUCOSE UR STRIP.AUTO-MCNC: NEGATIVE MG/DL
HCT VFR BLD AUTO: 42.5 % (ref 41–52)
HGB BLD-MCNC: 14.6 G/DL (ref 13.5–17.5)
IMM GRANULOCYTES # BLD AUTO: 0.02 X10*3/UL (ref 0–0.7)
IMM GRANULOCYTES NFR BLD AUTO: 0.2 % (ref 0–0.9)
KETONES UR STRIP.AUTO-MCNC: ABNORMAL MG/DL
LACTATE SERPL-SCNC: 0.8 MMOL/L (ref 0.4–2)
LEUKOCYTE ESTERASE UR QL STRIP.AUTO: NEGATIVE
LYMPHOCYTES # BLD AUTO: 1.5 X10*3/UL (ref 1.2–4.8)
LYMPHOCYTES NFR BLD AUTO: 16.3 %
MAGNESIUM SERPL-MCNC: 1.84 MG/DL (ref 1.6–2.4)
MCH RBC QN AUTO: 29.4 PG (ref 26–34)
MCHC RBC AUTO-ENTMCNC: 34.4 G/DL (ref 32–36)
MCV RBC AUTO: 86 FL (ref 80–100)
MONOCYTES # BLD AUTO: 1.07 X10*3/UL (ref 0.1–1)
MONOCYTES NFR BLD AUTO: 11.6 %
MUCOUS THREADS #/AREA URNS AUTO: NORMAL /LPF
NEUTROPHILS # BLD AUTO: 6.41 X10*3/UL (ref 1.2–7.7)
NEUTROPHILS NFR BLD AUTO: 69.7 %
NITRITE UR QL STRIP.AUTO: NEGATIVE
NRBC BLD-RTO: 0 /100 WBCS (ref 0–0)
PH UR STRIP.AUTO: 6.5 [PH]
PLATELET # BLD AUTO: 354 X10*3/UL (ref 150–450)
POTASSIUM SERPL-SCNC: 3.8 MMOL/L (ref 3.5–5.3)
PROT SERPL-MCNC: 8.3 G/DL (ref 6.4–8.2)
PROT UR STRIP.AUTO-MCNC: ABNORMAL MG/DL
RBC # BLD AUTO: 4.96 X10*6/UL (ref 4.5–5.9)
RBC # UR STRIP.AUTO: ABNORMAL /UL
RBC #/AREA URNS AUTO: NORMAL /HPF
RH FACTOR (ANTIGEN D): NORMAL
SODIUM SERPL-SCNC: 137 MMOL/L (ref 136–145)
SP GR UR STRIP.AUTO: <1.005
SQUAMOUS #/AREA URNS AUTO: NORMAL /HPF
UROBILINOGEN UR STRIP.AUTO-MCNC: ABNORMAL MG/DL
WBC # BLD AUTO: 9.2 X10*3/UL (ref 4.4–11.3)
WBC #/AREA URNS AUTO: NORMAL /HPF

## 2024-03-20 PROCEDURE — A9698 NON-RAD CONTRAST MATERIALNOC: HCPCS | Performed by: EMERGENCY MEDICINE

## 2024-03-20 PROCEDURE — 85025 COMPLETE CBC W/AUTO DIFF WBC: CPT | Performed by: EMERGENCY MEDICINE

## 2024-03-20 PROCEDURE — 36415 COLL VENOUS BLD VENIPUNCTURE: CPT | Performed by: EMERGENCY MEDICINE

## 2024-03-20 PROCEDURE — 93005 ELECTROCARDIOGRAM TRACING: CPT

## 2024-03-20 PROCEDURE — 83605 ASSAY OF LACTIC ACID: CPT | Performed by: EMERGENCY MEDICINE

## 2024-03-20 PROCEDURE — 99285 EMERGENCY DEPT VISIT HI MDM: CPT | Mod: 25

## 2024-03-20 PROCEDURE — 80053 COMPREHEN METABOLIC PANEL: CPT | Performed by: EMERGENCY MEDICINE

## 2024-03-20 PROCEDURE — 74177 CT ABD & PELVIS W/CONTRAST: CPT | Mod: FOREIGN READ | Performed by: RADIOLOGY

## 2024-03-20 PROCEDURE — 96375 TX/PRO/DX INJ NEW DRUG ADDON: CPT

## 2024-03-20 PROCEDURE — 99214 OFFICE O/P EST MOD 30 MIN: CPT | Performed by: SURGERY

## 2024-03-20 PROCEDURE — 2550000001 HC RX 255 CONTRASTS: Performed by: EMERGENCY MEDICINE

## 2024-03-20 PROCEDURE — 96361 HYDRATE IV INFUSION ADD-ON: CPT

## 2024-03-20 PROCEDURE — 81001 URINALYSIS AUTO W/SCOPE: CPT | Performed by: EMERGENCY MEDICINE

## 2024-03-20 PROCEDURE — 96374 THER/PROPH/DIAG INJ IV PUSH: CPT

## 2024-03-20 PROCEDURE — 2500000004 HC RX 250 GENERAL PHARMACY W/ HCPCS (ALT 636 FOR OP/ED): Performed by: EMERGENCY MEDICINE

## 2024-03-20 PROCEDURE — 83735 ASSAY OF MAGNESIUM: CPT | Performed by: EMERGENCY MEDICINE

## 2024-03-20 PROCEDURE — 74177 CT ABD & PELVIS W/CONTRAST: CPT

## 2024-03-20 PROCEDURE — 86901 BLOOD TYPING SEROLOGIC RH(D): CPT | Performed by: EMERGENCY MEDICINE

## 2024-03-20 PROCEDURE — C9113 INJ PANTOPRAZOLE SODIUM, VIA: HCPCS | Performed by: EMERGENCY MEDICINE

## 2024-03-20 RX ORDER — ONDANSETRON 4 MG/1
4 TABLET, ORALLY DISINTEGRATING ORAL EVERY 8 HOURS PRN
Qty: 20 TABLET | Refills: 0 | Status: SHIPPED | OUTPATIENT
Start: 2024-03-20 | End: 2024-03-27

## 2024-03-20 RX ORDER — THIAMINE HYDROCHLORIDE 100 MG/ML
100 INJECTION, SOLUTION INTRAMUSCULAR; INTRAVENOUS ONCE
Status: COMPLETED | OUTPATIENT
Start: 2024-03-20 | End: 2024-03-20

## 2024-03-20 RX ORDER — ONDANSETRON HYDROCHLORIDE 2 MG/ML
4 INJECTION, SOLUTION INTRAVENOUS ONCE
Status: COMPLETED | OUTPATIENT
Start: 2024-03-20 | End: 2024-03-20

## 2024-03-20 RX ORDER — MORPHINE SULFATE 4 MG/ML
4 INJECTION, SOLUTION INTRAMUSCULAR; INTRAVENOUS ONCE
Status: COMPLETED | OUTPATIENT
Start: 2024-03-20 | End: 2024-03-20

## 2024-03-20 RX ORDER — PANTOPRAZOLE SODIUM 40 MG/1
40 TABLET, DELAYED RELEASE ORAL
Qty: 30 TABLET | Refills: 0 | Status: SHIPPED | OUTPATIENT
Start: 2024-03-20 | End: 2025-03-20

## 2024-03-20 RX ORDER — PANTOPRAZOLE SODIUM 40 MG/10ML
40 INJECTION, POWDER, LYOPHILIZED, FOR SOLUTION INTRAVENOUS ONCE
Status: COMPLETED | OUTPATIENT
Start: 2024-03-20 | End: 2024-03-20

## 2024-03-20 RX ADMIN — SODIUM CHLORIDE, POTASSIUM CHLORIDE, SODIUM LACTATE AND CALCIUM CHLORIDE 1000 ML: 600; 310; 30; 20 INJECTION, SOLUTION INTRAVENOUS at 17:59

## 2024-03-20 RX ADMIN — THIAMINE HYDROCHLORIDE 100 MG: 100 INJECTION, SOLUTION INTRAMUSCULAR; INTRAVENOUS at 17:57

## 2024-03-20 RX ADMIN — MORPHINE SULFATE 4 MG: 4 INJECTION, SOLUTION INTRAMUSCULAR; INTRAVENOUS at 14:51

## 2024-03-20 RX ADMIN — PANTOPRAZOLE SODIUM 40 MG: 40 INJECTION, POWDER, FOR SOLUTION INTRAVENOUS at 17:52

## 2024-03-20 RX ADMIN — SODIUM CHLORIDE, POTASSIUM CHLORIDE, SODIUM LACTATE AND CALCIUM CHLORIDE 1000 ML: 600; 310; 30; 20 INJECTION, SOLUTION INTRAVENOUS at 14:50

## 2024-03-20 RX ADMIN — IOHEXOL 100 ML: 12 SOLUTION ORAL at 15:50

## 2024-03-20 RX ADMIN — ONDANSETRON 4 MG: 2 INJECTION INTRAMUSCULAR; INTRAVENOUS at 14:51

## 2024-03-20 RX ADMIN — IOHEXOL 75 ML: 350 INJECTION, SOLUTION INTRAVENOUS at 15:50

## 2024-03-20 ASSESSMENT — ENCOUNTER SYMPTOMS
NAUSEA: 0
DIARRHEA: 0
ABDOMINAL PAIN: 1
CONSTITUTIONAL NEGATIVE: 1
EYES NEGATIVE: 1
ENDOCRINE NEGATIVE: 1
CARDIOVASCULAR NEGATIVE: 1
CONSTIPATION: 1
NEUROLOGICAL NEGATIVE: 1
PSYCHIATRIC NEGATIVE: 1
HEMATOLOGIC/LYMPHATIC NEGATIVE: 1
RESPIRATORY NEGATIVE: 1
ABDOMINAL DISTENTION: 0
VOMITING: 0
MUSCULOSKELETAL NEGATIVE: 1
ALLERGIC/IMMUNOLOGIC NEGATIVE: 1

## 2024-03-20 ASSESSMENT — PAIN DESCRIPTION - PROGRESSION
CLINICAL_PROGRESSION: GRADUALLY IMPROVING
CLINICAL_PROGRESSION: NOT CHANGED

## 2024-03-20 ASSESSMENT — PAIN DESCRIPTION - ORIENTATION: ORIENTATION: MID

## 2024-03-20 ASSESSMENT — PAIN SCALES - GENERAL
PAINLEVEL_OUTOF10: 5 - MODERATE PAIN
PAINLEVEL_OUTOF10: 3

## 2024-03-20 ASSESSMENT — PAIN - FUNCTIONAL ASSESSMENT
PAIN_FUNCTIONAL_ASSESSMENT: 0-10
PAIN_FUNCTIONAL_ASSESSMENT: 0-10

## 2024-03-20 ASSESSMENT — PAIN DESCRIPTION - LOCATION: LOCATION: ABDOMEN

## 2024-03-20 ASSESSMENT — COLUMBIA-SUICIDE SEVERITY RATING SCALE - C-SSRS
6. HAVE YOU EVER DONE ANYTHING, STARTED TO DO ANYTHING, OR PREPARED TO DO ANYTHING TO END YOUR LIFE?: NO
1. IN THE PAST MONTH, HAVE YOU WISHED YOU WERE DEAD OR WISHED YOU COULD GO TO SLEEP AND NOT WAKE UP?: NO
2. HAVE YOU ACTUALLY HAD ANY THOUGHTS OF KILLING YOURSELF?: NO

## 2024-03-20 NOTE — ED PROVIDER NOTES
HPI   Chief Complaint   Patient presents with    Abdominal Pain     Patient arrives from home with complaints of lower mid abdominal pain that radiates up the abdomen.  Patient states he had gastric bypass on February 7th.  He states the pain has been ongoing since Thursday and he also states he's not been able to tolerate any solid foods since then.  He has been on a liquid diet for a few days and attempted to have a yogurt today and the pain increased with nausea.  He also states his bowel movements have been very small and infrequent.       Patient is a 33-year-old male, medical history significant for gastric bypass surgery on February 7, presents to the emergency department with food intolerance.  Patient states that basically since Saturday, he has been unable to tolerate any solid foods.  He states that he is able to drink some fluid, but even yogurt and protein smoothies he would have nausea and vomiting.  He describes some upper abdominal pain.  He states he is also dealing with constipation.  States that he did discuss with gastric surgery given persistent symptoms he was sent in for further evaluation.  He denies fever.  He denies chills or sweats.                          No data recorded                   Patient History   Past Medical History:   Diagnosis Date    Anxiety     Asthma     as a child    Depression     Metabolic syndrome     Morbid obesity (CMS/HCC)     Nephrolithiasis     Sleep apnea      Past Surgical History:   Procedure Laterality Date    BARIATRIC SURGERY  02/07/2024    miguelina-en-y gastric bypass    COLONOSCOPY       Family History   Problem Relation Name Age of Onset    Breast cancer Mother      Edema Father      Edema Sister       Social History     Tobacco Use    Smoking status: Never    Smokeless tobacco: Never   Vaping Use    Vaping Use: Never used   Substance Use Topics    Alcohol use: Not Currently    Drug use: Never       Physical Exam   ED Triage Vitals [03/20/24 1407]    Temperature Heart Rate Respirations BP   36.4 °C (97.5 °F) 78 18 120/68      Pulse Ox Temp Source Heart Rate Source Patient Position   96 % Temporal Monitor Sitting      BP Location FiO2 (%)     Right arm --       Physical Exam  Vitals and nursing note reviewed.   Constitutional:       General: He is not in acute distress.     Appearance: He is well-developed.   HENT:      Head: Normocephalic and atraumatic.   Eyes:      Extraocular Movements: Extraocular movements intact.      Conjunctiva/sclera: Conjunctivae normal.      Pupils: Pupils are equal, round, and reactive to light.   Cardiovascular:      Rate and Rhythm: Normal rate and regular rhythm.      Heart sounds: No murmur heard.  Pulmonary:      Effort: Pulmonary effort is normal. No respiratory distress.      Breath sounds: Normal breath sounds.   Abdominal:      General: Abdomen is flat.      Palpations: Abdomen is soft.      Tenderness: There is abdominal tenderness in the epigastric area. There is no guarding or rebound.   Musculoskeletal:         General: No swelling.      Cervical back: Neck supple.   Skin:     General: Skin is warm and dry.      Capillary Refill: Capillary refill takes less than 2 seconds.   Neurological:      General: No focal deficit present.      Mental Status: He is alert and oriented to person, place, and time.   Psychiatric:         Mood and Affect: Mood normal.         ED Course & MDM   ED Course as of 03/20/24 1803   Wed Mar 20, 2024   1454 CBC unremarkable. [MK]   1530 Metabolic panel unremarkable.  Liver functions normal.  Magnesium normal.  Lactic acid normal. [MK]      ED Course User Index  [MK] Tato Barber MD         Diagnoses as of 03/20/24 1803   Nausea and vomiting, unspecified vomiting type   Dehydration       Medical Decision Making  Medical Decision Making: Patient presents emergency department with nausea, vomiting, and food intolerance.  He is status post Elpidio-en-Y gastric bypass.  On arrival, he has mild  pain but no rebound or guarding.  IV was established.  Labs are obtained.  Labs are unremarkable.  Patient underwent CT imaging.  There is no evidence of internal hernia, bowel obstruction, or other dangerous process.  I did discuss this with gastric surgery, Dr. Berg.  He is comfortable with patient following up as an outpatient which is the patient's preference.  I did  him the importance of taking his Protonix.  He will also be given antiemetics.    EKG interpreted by myself (ED attending physician): Sinus rhythm.  Rate of 64.  Normal axis and intervals.  No acute ischemia.    Differential Diagnoses Considered: Bowel obstruction, pancreatitis, gastritis, hernia    Chronic Medical Conditions Significantly Affecting Care: History of Elpidio-en-Y    External Records Reviewed: I reviewed recent and relevant outside records including: Outpatient operative note    Independent Interpretation of Studies:  I independently interpreted: CT obtained reviewed    Escalation of Care:  Appropriate for outpatient management after discussion with surgery    Social Determinants of Health Significantly Affecting Care:  No social determinants    Prescription Drug Consideration: Protonix and Zofran    Diagnostic testing considered: Noncontributory    Discussion of Management with Other Providers:   I discussed the patient/results with: Gastric surgery          Procedure  Procedures     Tato Barber MD  03/20/24 4343

## 2024-03-20 NOTE — CONSULTS
Reason For Consult  Abdominal pain    History Of Present Illness  Soren Cheema is a 33 y.o. male w/ h/o RGB on 2/7 here for abdominal pain.   He was readmitted for bloody BMS on 2/21 since he was taking ppx lovenox. The patient was doing overall well postoperatively with diet advancement. He was advanced to soft food ~two weeks ago. He was doing well up until a week ago when he had this upper respiratory infection and is still suffering from that. He had tried wonton dumplings and chicken which did not sit well with him and had some regurgitation and upper abdominal discomfort and pain. No heartburn. No fever/chills. He talked to the dietician and [facility] two days ago and was told to go back to [facility] briefly. He has also been constipated and had started MOM. He has not been taking the omeprazole the past few days. He has not had any bloody Bms again.     In the ED, his vitals were normal. His labs were normal. CT was done with PO and IV contrast that showed contrast in small bowel and questionable mild gastric thickening ?gastritis. No other pathology.     Past Medical History  He has a past medical history of Anxiety, Asthma, Depression, Metabolic syndrome, Morbid obesity (CMS/HCC), Nephrolithiasis, and Sleep apnea.    Surgical History  He has a past surgical history that includes Colonoscopy and Bariatric Surgery (02/07/2024).     Social History  He reports that he has never smoked. He has never used smokeless tobacco. He reports that he does not currently use alcohol. He reports that he does not use drugs.    Family History  Family History   Problem Relation Name Age of Onset    Breast cancer Mother      Edema Father      Edema Sister          Allergies  Shellfish derived    Review of Systems  Review of Systems   Constitutional: Negative.    HENT: Negative.     Eyes: Negative.    Respiratory: Negative.     Cardiovascular: Negative.    Gastrointestinal:  Positive for abdominal pain and constipation. Negative for  "abdominal distention, diarrhea, nausea and vomiting.   Endocrine: Negative.    Genitourinary: Negative.    Musculoskeletal: Negative.    Skin: Negative.    Allergic/Immunologic: Negative.    Neurological: Negative.    Hematological: Negative.    Psychiatric/Behavioral: Negative.            Physical Exam  Physical Exam  Vitals reviewed.   Constitutional:       Appearance: Normal appearance. He is obese.   HENT:      Head: Normocephalic and atraumatic.      Nose: Nose normal.      Mouth/Throat:      Mouth: Mucous membranes are moist.   Eyes:      Extraocular Movements: Extraocular movements intact.      Pupils: Pupils are equal, round, and reactive to light.   Cardiovascular:      Rate and Rhythm: Normal rate and regular rhythm.   Pulmonary:      Effort: Pulmonary effort is normal.   Abdominal:      General: There is no distension (mild).      Palpations: Abdomen is soft.      Tenderness: There is no abdominal tenderness (appropriate incisional). There is no guarding or rebound.      Comments: Incisions c/d/I. Binder on   Musculoskeletal:         General: Normal range of motion.      Cervical back: Normal range of motion and neck supple.   Skin:     General: Skin is warm and dry.      Capillary Refill: Capillary refill takes less than 2 seconds.   Neurological:      General: No focal deficit present.      Mental Status: He is alert.   Psychiatric:         Mood and Affect: Mood normal.         Behavior: Behavior normal.         Thought Content: Thought content normal.         Judgment: Judgment normal.            Last Recorded Vitals  Blood pressure 109/59, pulse 62, temperature 36.4 °C (97.5 °F), temperature source Temporal, resp. rate 16, height 1.753 m (5' 9\"), weight 120 kg (265 lb), SpO2 99 %.    Relevant Results  Results for orders placed or performed during the hospital encounter of 03/20/24 (from the past 24 hour(s))   CBC and Auto Differential   Result Value Ref Range    WBC 9.2 4.4 - 11.3 x10*3/uL    nRBC 0.0 " 0.0 - 0.0 /100 WBCs    RBC 4.96 4.50 - 5.90 x10*6/uL    Hemoglobin 14.6 13.5 - 17.5 g/dL    Hematocrit 42.5 41.0 - 52.0 %    MCV 86 80 - 100 fL    MCH 29.4 26.0 - 34.0 pg    MCHC 34.4 32.0 - 36.0 g/dL    RDW 13.7 11.5 - 14.5 %    Platelets 354 150 - 450 x10*3/uL    Neutrophils % 69.7 40.0 - 80.0 %    Immature Granulocytes %, Automated 0.2 0.0 - 0.9 %    Lymphocytes % 16.3 13.0 - 44.0 %    Monocytes % 11.6 2.0 - 10.0 %    Eosinophils % 1.8 0.0 - 6.0 %    Basophils % 0.4 0.0 - 2.0 %    Neutrophils Absolute 6.41 1.20 - 7.70 x10*3/uL    Immature Granulocytes Absolute, Automated 0.02 0.00 - 0.70 x10*3/uL    Lymphocytes Absolute 1.50 1.20 - 4.80 x10*3/uL    Monocytes Absolute 1.07 (H) 0.10 - 1.00 x10*3/uL    Eosinophils Absolute 0.17 0.00 - 0.70 x10*3/uL    Basophils Absolute 0.04 0.00 - 0.10 x10*3/uL   Magnesium   Result Value Ref Range    Magnesium 1.84 1.60 - 2.40 mg/dL   Comprehensive metabolic panel   Result Value Ref Range    Glucose 85 74 - 99 mg/dL    Sodium 137 136 - 145 mmol/L    Potassium 3.8 3.5 - 5.3 mmol/L    Chloride 101 98 - 107 mmol/L    Bicarbonate 25 21 - 32 mmol/L    Anion Gap 15 10 - 20 mmol/L    Urea Nitrogen 14 6 - 23 mg/dL    Creatinine 0.65 0.50 - 1.30 mg/dL    eGFR >90 >60 mL/min/1.73m*2    Calcium 10.0 8.6 - 10.3 mg/dL    Albumin 4.5 3.4 - 5.0 g/dL    Alkaline Phosphatase 71 33 - 120 U/L    Total Protein 8.3 (H) 6.4 - 8.2 g/dL    AST 18 9 - 39 U/L    Bilirubin, Total 0.6 0.0 - 1.2 mg/dL    ALT 30 10 - 52 U/L   Lactate   Result Value Ref Range    Lactate 0.8 0.4 - 2.0 mmol/L   Type And Screen   Result Value Ref Range    ABO TYPE A     Rh TYPE POS     ANTIBODY SCREEN NEG    Urinalysis with Reflex Culture and Microscopic   Result Value Ref Range    Color, Urine Yellow Straw, Yellow    Appearance, Urine Clear Clear    Specific Gravity, Urine <1.005 (A) 1.005 - 1.035    pH, Urine 6.5 5.0, 5.5, 6.0, 6.5, 7.0, 7.5, 8.0    Protein, Urine 30 (1+) (A) NEGATIVE, 10 (TRACE) mg/dL    Glucose, Urine NEGATIVE  NEGATIVE mg/dL    Blood, Urine 0.03 (TRACE) (A) NEGATIVE    Ketones, Urine 80 (3+) (A) NEGATIVE mg/dL    Bilirubin, Urine NEGATIVE NEGATIVE    Urobilinogen, Urine NORM NORM mg/dL    Nitrite, Urine NEGATIVE NEGATIVE    Leukocyte Esterase, Urine NEGATIVE NEGATIVE   Urinalysis Microscopic   Result Value Ref Range    WBC, Urine 1-5 1-5, NONE /HPF    RBC, Urine 1-2 NONE, 1-2, 3-5 /HPF    Squamous Epithelial Cells, Urine 1-9 (SPARSE) Reference range not established. /HPF    Mucus, Urine 1+ Reference range not established. /LPF          Assessment/Plan     33M s/p RYGB on 2/7 re-admitted for one day for bloody Bms (was on ppx lovenox) here for abdominal pain post prandial.   CT reviewed: reassuring  Exam is also reassuring as the patient has no tenderness.     PO liquid trial in the hospital before discharge  The patient can go back on puree and soft diet as he tolerates.   Counseled him on:   - Mindful eating and chewing well  - Choice of food  - Attempt different kinds to see what works  - Taking omeprazole daily and open the capsule to sprinkle over yogurt or apple sauce  - If symptoms persist to call us    He might require more workup including an EGD with dilation if he continues to have issues.   Patient verbalized understanding.   He has an appointment with RD on 4/1 and [facility] on 4/29.     Christy Walker MD

## 2024-03-20 NOTE — ED TRIAGE NOTES
Patient arrives from home with complaints of lower mid abdominal pain that radiates up the abdomen.  Patient states he had gastric bypass on February 7th.  He states the pain has been ongoing since Thursday and he also states he's not been able to tolerate any solid foods since then.  He has been on a liquid diet for a few days and attempted to have a yogurt today and the pain increased with nausea.  He also states his bowel movements have been very small and infrequent.

## 2024-03-21 LAB
ATRIAL RATE: 61 BPM
HOLD SPECIMEN: NORMAL
P AXIS: 39 DEGREES
PR INTERVAL: 177 MS
Q ONSET: 252 MS
QRS COUNT: 10 BEATS
QRS DURATION: 101 MS
QT INTERVAL: 385 MS
QTC CALCULATION(BAZETT): 398 MS
QTC FREDERICIA: 393 MS
R AXIS: 29 DEGREES
T AXIS: 12 DEGREES
T OFFSET: 444 MS
VENTRICULAR RATE: 64 BPM

## 2024-04-01 ENCOUNTER — NUTRITION (OUTPATIENT)
Dept: SURGERY | Facility: CLINIC | Age: 34
End: 2024-04-01
Payer: MEDICAID

## 2024-04-01 VITALS — HEIGHT: 69 IN | WEIGHT: 259 LBS | BODY MASS INDEX: 38.36 KG/M2

## 2024-04-01 NOTE — PROGRESS NOTES
Surgery Date:  2/7/24  Surgeon:  Morena  Procedure:  gastric bypass    ASSESSMENT:  Current weight pounds:     259.0             Ht:    69.0   in.        BMI:  38.25  Previous weight pounds:   264.0  3/18/24  Initial start weight pounds:    338.0   6/8/23  EBW pounds:  174.0   Total weight change pounds:  79.0  %EBW Lost: 45.4    PROGRESS:  Nutrition Interventions for last encounter (date):   1.          Keep working on eating high protein foods.  Remember to chew them well and eat slowly.  Continue to drink 1-2 protein shakes to help you meet your protein goal.   2.          Continue to drink 64 oz. of zero calorie beverages per day  3.          Continue no drinking 30 min before, during the meal and for 30 minutes after the meal  4.          Increase intensity and duration of exercise  5.          Advance to transition diet. Try raw veggies, fresh fruit and lean ground beef.   6.          Eat slowly, chew thoroughly  7.          Try one new food at a time.   8.          Try stopping your calcium for a few days to see if that help relieve the constipation.   9.          Attend monthly support groups    CHANGES IN TREATMENT:   Patient met goals: Yes    No    Partially   24 hour food recall:   Breakfast:    Greek yogurt  12 g   Snack:     none  Lunch:    protein shake 30 g   Snack:   none  Dinner:     2 oz ham, 3-4 baby carrots, scalloped potatoes, green beans, helena slaw   Snack:  none  Beverages:   50 oz and protein shake , occasional Gatorade   Alcohol:   none    Vitamins:    2 Flintstones, B12 500 mcg   Physical Activity:  walking from [facility], some walking outside 15-20 min 3-4x/week        READINESS TO LEARN:  Motivation to learn:           Interested      Understanding of instruction: Good       Anticipated Compliance: Good      Family Support: Unable to assess-family not present     Patient presents with post-op weight loss surgery gastric bypass.  Pt is 2 months postop. Patient has lost 79.0  pounds since initial  assessment accounting for 45.4% loss excess body weight.  Tolerating a soft  diet without difficulty.  Protein intake is not adequate for post-op individual. Fluid consumption is adequate. Patient is supplementing recommended vitamin/minerals.  Pt is not taking calcium currently to help encourage bowel movements.  Pt states concerns/difficulties with some constipation.  Advised to add in fruits and vegetables to his diet to help provide some fiber and bulk.  Advised to increase protein intake through food.    Discussed the transition diet. Reminded pt to eat slowly, chew thoroughly and to try on new food at a time.     Malnutrition Screening  Significant unintentional weight loss? n/a  Eating less than 75% of usual intake for more than 2 weeks? n/a    Nutrition Diagnosis:   1. Increased protein and nutrition needs related to altered GI function as evidenced by pt. s/p gastric bypass.  2. Food- and nutrition-related knowledge deficit related to lack of prior exposure to surgical weight loss information as evidenced by diet recall.     Nutrition Interventions:   1. Modify type and amount of food and nutrients within meals and snacks.  2. Comprehensive Nutrition Education  -Nutrition education materials: SG schedule        Recommendations:    1. Eat 60-70 g of protein per day.  Aim for 2 oz or 14 g protein per meal and have 2 high protein snacks that are  10-20 g protein each.  You can try a tuna or chicken packet, Greek yogurt, 2 string cheeses, Protein bars like Quest, Pure Protein, Premier, or Built Bars. you can also try protein chips form Quest or Atkins.    2. Drink 64 oz. of zero calorie beverages per day  3. Continue no drinking 30 min before, during the meal and for 30 minutes after the meal  4. Increase intensity and duration of exercise. Add weight training/strength exercises to your routine.   5. Advance to transition diet. Try raw veggies, fresh fruit and lean ground beef.   6. Eat slowly, chew  thoroughly  7. Try one new food at a time.   8. Continue current vit/min regimen  9.          Attend monthly support groups    Nutrition Monitoring and Evaluation:   1-2 pounds weight loss per week  Criteria: weight check, food recall  Need for Follow-up: 3 months      Jessica MARTÍNEZ, [facility]  Bariatric Surgery Dietitian  Phone: [phone]  Fax: [phone]

## 2024-04-29 ENCOUNTER — APPOINTMENT (OUTPATIENT)
Dept: SURGERY | Facility: CLINIC | Age: 34
End: 2024-04-29
Payer: MEDICAID

## 2024-04-29 NOTE — ASSESSMENT & PLAN NOTE
- 3 mo s/p RYGB  -Patient is doing well, some issues with regurgitation of new foods with advancing diet.   -reinforced need to chew thoroughly, eat slow, smaller frequent meals  - continue prioritizing protein and fluids  - no acute issues   takes supplements   does regular exercise  no pain or GERD or nausea. Incisions healed. Energy improving with steady weight loss.      Recs:   doing well  No acute issues since ED discharge   advised to continue Diet, exercise,  FU with dietitian, discuss advancing diet  continue vitamin supplementation   join support groups  will check labs  FU 3 mo for 6 month post-op visit, labs- see orders

## 2024-04-29 NOTE — PROGRESS NOTES
BARIATRIC SURGERY CLINIC  Comprehensive Weight Management        Patient: Soren Cheema   MRN: [id number]      Index Surgery:  Date: 02/07/2024  Surgeon: Dr. Berg  Type: RYGB     Virtual or Telephone Consent:  A telephone visit (audio or visual only) between the patient (at the originating site) and the provider (at the distant site) was utilized to provide this telehealth service. Verbal consent was requested and obtained from Soren Cheema on this date, 04/28/24 for a telehealth visit.      HPI   Soren Cheema is a 33 y.o. male presenting for routine 6 week post-op apt s/p RYGB 2/07/2024. Initially uncomplicated post-op course. Presented to ED then 2/22/24 for possible GI bleed/dehydration after an episode of syncope at home. Determined to have passed clot from blood thinner, no longer on lovenox . Imaging was normal, discharged 2/23 and no further issues. ***     Diet:  - soft food diet, started 3/4 ***  - some issues with regurgitation, reinforced need to eat slowly, chew thoroughly, eat smaller more frequent meals. Wants to increase dietary protein. Discussed working off shakes, he has follow up with RD today to further advise diet advancement / modification.   - Recall: B- protein shake, L- eggs and cheese D- soup S- yogurt     Exercise:  - walking, increasing activity as tolerated.     Concerns related to:  Nausea/Vomiting, Reflux: denies ***  Regurgitation- yes, with trying new foods, advancing diet. Provided patient reassurance. Discussed imaging from hospital. He is overall doing well but fears the regurgitation, that something is wrong. Spoke to Dr. Berg yest after issue of wonton from soup feeling 'stuck'. No other issues.   Abdominal Pain: denies  Diarrhea/Constipation- has some regular BM but constipation persists. Recommend milk of mag daily for now. Discussed adding fiber supplement, increasing dietary fiber and fluids.      Daily Supplements:  Calcium: Calcium Citrate w/ vitamin D (1200 -  1500mg)  Multivitamin & Minerals: 2 per day  Vitamin B12: 500 mcg/day   Vitamin D3: 3000 units   Iron/Other: iron supplement   PPI: taking     Primary Medical Hx:  Patient Active Problem List   Diagnosis    Chronic midline low back pain with right-sided sciatica    Metabolic syndrome    Obesity, Class III, BMI 40-49.9 (morbid obesity) (Multi)    Vitamin D deficiency    S/P gastric bypass    RADHIKA (obstructive sleep apnea)    Postoperative malabsorption ([facility])      Past Surgical History:   Procedure Laterality Date    BARIATRIC SURGERY  02/07/2024    miguelina-en-y gastric bypass    COLONOSCOPY        Social History     Socioeconomic History    Marital status:      Spouse name: Not on file    Number of children: Not on file    Years of education: Not on file    Highest education level: Not on file   Occupational History    Not on file   Tobacco Use    Smoking status: Never    Smokeless tobacco: Never   Vaping Use    Vaping status: Never Used   Substance and Sexual Activity    Alcohol use: Not Currently    Drug use: Never    Sexual activity: Defer   Other Topics Concern    Not on file   Social History Narrative    Not on file     Social Determinants of Health     Financial Resource Strain: Low Risk  (2/21/2024)    Overall Financial Resource Strain (CARDIA)     Difficulty of Paying Living Expenses: Not very hard   Food Insecurity: No Food Insecurity (5/24/2023)    Received from [facility]    Hunger Vital Sign     Worried About Running Out of Food in the Last Year: Never true     Ran Out of Food in the Last Year: Never true   Transportation Needs: No Transportation Needs (2/21/2024)    PRAPARE - Transportation     Lack of Transportation (Medical): No     Lack of Transportation (Non-Medical): No   Physical Activity: Insufficiently Active (5/24/2023)    Received from [facility]    Exercise Vital Sign     Days of Exercise per Week: 2 days     Minutes of Exercise per Session: 20 min   Stress: Stress Concern  "Present (5/24/2023)    Received from [facility]    [employer] - Occupational Stress Questionnaire     Feeling of Stress : Very much   Social Connections: Moderately Integrated (5/24/2023)    Received from [facility]    Social Connection and Isolation Panel [NHANES]     Frequency of Communication with Friends and Family: Twice a week     Frequency of Social Gatherings with Friends and Family: Once a week     Attends Judaism Services: Never     Active Member of Clubs or Organizations: No     Attends Club or Organization Meetings: More than 4 times per year     Marital Status:    Intimate Partner Violence: Not on file   Housing Stability: Low Risk  (2/21/2024)    Housing Stability Vital Sign     Unable to Pay for Housing in the Last Year: No     Number of Places Lived in the Last Year: 1     Unstable Housing in the Last Year: No     Family History   Problem Relation Name Age of Onset    Breast cancer Mother      Edema Father      Edema Sister        Current Outpatient Medications on File Prior to Visit   Medication Sig Dispense Refill    multivitamin-children's (Cerovite, Jr) chewable tablet Chew 2 tablets once daily.      omeprazole (PriLOSEC) 40 mg DR capsule Take 1 capsule (40 mg) by mouth once daily in the morning. Take before meals. Do not crush or chew. Open capsule, sprinkle beads on SF jello, pudding or applesauce. 90 capsule 1    pantoprazole (ProtoNix) 40 mg EC tablet Take 1 tablet (40 mg) by mouth once daily in the morning. Take before meals. Do not crush, chew, or split. 30 tablet 0     No current facility-administered medications on file prior to visit.      @Allerg@    REVIEW OF SYSTEMS:  Negative unless otherwise reviewed in HPI.    PHYSICAL EXAM   Physical Exam   Ht: 5'9\"             Wt: 264 lbs      BMI: 38.99 ***  General- No acute distress, well appearing and well nourished.   Eyes Conjunctiva and lids: No erythema, swelling or discharge  Neck " appears supple  CV: self-reports reg rate / rhythm   Pulmonary: Respiratory effort: Normal respiration. unlabored  Abdomen: Central adiposity  Neurologic - Coordination: Normal gait   Extremities: No clubbing, cyanosis  Psychiatric - Orientation to person, place, and time: Normal. Mood and affect: Normal.     ASSESSMENT & PLAN  33 y.o. male presenting for 6 week post-op visit s/p rygb.      Weight Impression  -Initial Weight: 313 lbs  -Today's Weight: 264 lbs  -%Total Weight Loss: -15.65% ***    Problem List Items Addressed This Visit       Vitamin D deficiency    S/P gastric bypass - Primary     - 3 mo s/p RYGB  -Patient is doing well, some issues with regurgitation of new foods with advancing diet.   -reinforced need to chew thoroughly, eat slow, smaller frequent meals  - continue prioritizing protein and fluids  - no acute issues   takes supplements   does regular exercise  no pain or GERD or nausea. Incisions healed. Energy improving with steady weight loss.      Recs:   doing well  No acute issues since ED discharge   advised to continue Diet, exercise,  FU with dietitian, discuss advancing diet  continue vitamin supplementation   join support groups  will check labs  FU 3 mo for 6 month post-op visit, labs- see orders          Postoperative malabsorption ([facility])     Check micronutrient levels - see orders  Adjust micronutrient supplementation per results  Continue recommended post bariatric surgery supplements              Please see Patient Instructions for today's relevant referrals, orders and instructions.  > 50% of time spent counseling on lifestyle modifications to support weight loss.      Follow Up: No follow-ups on file.     Neha Garcia, APRN-CNP

## 2024-05-03 ENCOUNTER — TELEMEDICINE (OUTPATIENT)
Dept: SURGERY | Facility: CLINIC | Age: 34
End: 2024-05-03
Payer: MEDICAID

## 2024-05-03 DIAGNOSIS — E55.9 VITAMIN D DEFICIENCY: ICD-10-CM

## 2024-05-03 DIAGNOSIS — Z98.84 S/P GASTRIC BYPASS: ICD-10-CM

## 2024-05-03 DIAGNOSIS — K91.2 POSTOPERATIVE MALABSORPTION (HHS-HCC): Primary | ICD-10-CM

## 2024-05-03 PROCEDURE — 99024 POSTOP FOLLOW-UP VISIT: CPT

## 2024-05-03 PROCEDURE — 3008F BODY MASS INDEX DOCD: CPT

## 2024-05-03 NOTE — PATIENT INSTRUCTIONS
"The following are the recommendations we discussed at your appointment today:  1. Nutrition  - Please make sure you are seeing the bariatric dietitian regularly.    Dietitian Information:   Osmel Lopez: [phone]  [facility] [place] [phone]    Your Protein Goals will gradually increase as you get further out from surgery:  0-3 months - 60 GRAMS PER DAY  3-6 months - 60-75 GRAMS PER DAY  6-12 months - 75-90 GRAMS PER DAY  12+ months - 80-90 GRAMS PER DAY    - Follow Pouch Rules;.   Stop drinking 30 minutes before your meals, Take 30 minutes to eat your meal   - NO DRINKING DURING MEALS, Wait for 30 minutes after your meal to drink  - Eat 5 servings of fruits and veggies daily.  A serving is 1 small (tennis ball size) piece of whole fruit, 1/2 cup fresh fruit, 1 cup non starchy vegetables  - Eat 3 small meals and 1-2 healthy, protein rich, snacks per day.    EAT PROTEIN FIRST AT MEALS, 2nd non starchy vegetable or fruit serving, consume starches last and aim for whole grains of small portion.  This pattern of eating ensures you are getting full on high quality protein and higher fiber foods with many vitamins and minerals to support adequate nutrition first.      2. Exercise Recommendations:    Regular physical activity is CRITICAL for long term successful weight management.    Strive for a minimum weekly exercise goal of at least 200 minutes per week of aerobic exercise (45 minutes at least 5 days per week or 30 minutes daily)    Strength based resistance training is critical for helping to maintain and build lean body mass/muscle mass which is very important for long term health.  Having higher muscle mass is associated with better health outcomes and can help to maintain higher calorie expenditure.      Designing a Strength Program:  Select 3-4 exercises that target different major muscle groups.  - Emphasize the following movements \"pull, push, squat, hip hinge\"  \"Pull\" examples - pull up, " "bent over row or dumbbell row, pull down with weight bar or resistance band  \"Push\" examples - push up, bench press, chest flys, dips, overhead press, dumbbell bench press  \"Squat\" examples - air squat, chair squat, lunge steps, goblet squat, front squat, etc  \"Hip hinge\" examples - kettle bell swing, good morning, glute bridge, deadlift, suitcase pick ups, hip thrust    Perform two to three sets of eight to 15 repetitions of each exercise.  Try to use a resistance that feels like an \"8 out of 10\" effort, with 10 being the highest effort you can give.    To get stronger, try increasing either the weight, number of repetitions, number of sets or number of exercises every 4-8 weeks as you feel more comfortable with your current program.      3. Fluids  - Drink at least 60 oz of water every day.   - Wait 30 minutes before or after meals to drink fluids.  - Avoid carbonated beverages.    4. Vitamins  - Remember to take your multivitamins 2 times daily, once in the morning and once in the evening  - Take your calcium 2-3 times daily, at least 2 hours apart from the multivitamin - total daily dose at least 1200-1500mg per day.    - Vitamin D3 3000 units per day  - B12 - depending on your blood work and how well you absorb B12 from your multivitamin you may need additional B12 of 350-500mcg oral per day.    5. Labs  - We will check labs today and results will be communicated via [facility] My Chart  - A copy of the results can be viewed on  My Chart.      Follow-up with Dietitian for bariatric diet optimization  Follow-up with PCP for general health questions and ongoing management of routine health concerns     "

## 2024-05-03 NOTE — ASSESSMENT & PLAN NOTE
Pt 3 months post op   s/p GBP  On-going nausea with some solid foods, mainly chicken  diet ok, hitting protein and fluid goals, relying on shakes or protein shots  exercise - increasing as tolerated. Two new foster kids at home keeping him busy.  supplements ok   no acute issues    recs:     cont low carb diet, work on introducing more protein sources of food, still relying on shots. Some meat intolerances. Is planning to try slow cooker recipes for meat  increase exercise  cont supplementation , PPI  FU with dietitian   join support group   RTC 3 months for 6 mo pov  will get 3 months labs- orders placed

## 2024-05-03 NOTE — PROGRESS NOTES
BARIATRIC SURGERY CLINIC  Comprehensive Weight Management        Patient: Soren Cheema   MRN: [id number]      Index Surgery:  Date: 02/07/2024  Surgeon: Dr. Berg  Type: RYGB     Virtual or Telephone Consent:  A telephone visit (audio or visual only) between the patient (at the originating site) and the provider (at the distant site) was utilized to provide this telehealth service. Verbal consent was requested and obtained from Soren Cheema on this date, 05/03/24 for a telehealth visit.      HPI   Soren Cheema is a 33 y.o. male presenting for routine 3 mo post-op apt s/p RYGB 2/07/2024. Initially uncomplicated post-op course. Presented to ED 2/22/24 for possible GI bleed/dehydration after an episode of syncope at home. Determined to have passed clot from blood thinner, no longer on lovenox . Imaging was normal, discharged 2/23 and no further issues. Prior impression- doing well.      Diet:  - soft food diet, started 3/4 advancing slowly to regular diet.  - some issues with nausea on-going, no issue with regurgitation. Reinforced need to eat slowly, chew thoroughly, eat smaller more frequent meals. Working to increase dietary protein, trying slow cooker meats. Discussed working off shakes and protein shots.  - Recall: B- protein shake, L- eggs and cheese D- soup S- yogurt     Exercise:  - walking, increasing activity as tolerated.     Concerns related to:  Nausea/Vomiting, Reflux: denies  Abdominal Pain: denies  Diarrhea/Constipation- has some regular BM but constipation persists. Recommend milk of mag daily for now. Discussed adding fiber supplement, increasing dietary fiber and fluids.      Daily Supplements:  Calcium: Calcium Citrate w/ vitamin D (1200 - 1500mg)  Multivitamin & Minerals: 2 per day  Vitamin B12: 500 mcg/day   Vitamin D3: 3000 units   Iron/Other: iron supplement   PPI: taking    Primary Medical Hx:  Patient Active Problem List   Diagnosis    Chronic midline low back pain with right-sided sciatica     Metabolic syndrome    Obesity, Class III, BMI 40-49.9 (morbid obesity) (Multi)    Vitamin D deficiency    S/P gastric bypass    RADHIKA (obstructive sleep apnea)    Postoperative malabsorption ([facility])      Past Surgical History:   Procedure Laterality Date    BARIATRIC SURGERY  02/07/2024    miguelina-en-y gastric bypass    COLONOSCOPY        Social History     Socioeconomic History    Marital status:      Spouse name: Not on file    Number of children: Not on file    Years of education: Not on file    Highest education level: Not on file   Occupational History    Not on file   Tobacco Use    Smoking status: Never    Smokeless tobacco: Never   Vaping Use    Vaping status: Never Used   Substance and Sexual Activity    Alcohol use: Not Currently    Drug use: Never    Sexual activity: Defer   Other Topics Concern    Not on file   Social History Narrative    Not on file     Social Determinants of Health     Financial Resource Strain: Low Risk  (2/21/2024)    Overall Financial Resource Strain (CARDIA)     Difficulty of Paying Living Expenses: Not very hard   Food Insecurity: No Food Insecurity (5/24/2023)    Received from [facility]    Hunger Vital Sign     Worried About Running Out of Food in the Last Year: Never true     Ran Out of Food in the Last Year: Never true   Transportation Needs: No Transportation Needs (2/21/2024)    PRAPARE - Transportation     Lack of Transportation (Medical): No     Lack of Transportation (Non-Medical): No   Physical Activity: Insufficiently Active (5/24/2023)    Received from [facility]    Exercise Vital Sign     Days of Exercise per Week: 2 days     Minutes of Exercise per Session: 20 min   Stress: Stress Concern Present (5/24/2023)    Received from [facility]    [employer] [place] [employer] - Occupational Stress Questionnaire     Feeling of Stress : Very much   Social Connections: Moderately Integrated (5/24/2023)    Received from [facility]    Social  "Connection and Isolation Panel [NHANES]     Frequency of Communication with Friends and Family: Twice a week     Frequency of Social Gatherings with Friends and Family: Once a week     Attends Hoahaoism Services: Never     Active Member of Clubs or Organizations: No     Attends Club or Organization Meetings: More than 4 times per year     Marital Status:    Intimate Partner Violence: Not on file   Housing Stability: Low Risk  (2/21/2024)    Housing Stability Vital Sign     Unable to Pay for Housing in the Last Year: No     Number of Places Lived in the Last Year: 1     Unstable Housing in the Last Year: No     Family History   Problem Relation Name Age of Onset    Breast cancer Mother      Edema Father      Edema Sister        Current Outpatient Medications on File Prior to Visit   Medication Sig Dispense Refill    multivitamin-children's (Cerovite, Jr) chewable tablet Chew 2 tablets once daily.      omeprazole (PriLOSEC) 40 mg DR capsule Take 1 capsule (40 mg) by mouth once daily in the morning. Take before meals. Do not crush or chew. Open capsule, sprinkle beads on SF jello, pudding or applesauce. 90 capsule 1    pantoprazole (ProtoNix) 40 mg EC tablet Take 1 tablet (40 mg) by mouth once daily in the morning. Take before meals. Do not crush, chew, or split. 30 tablet 0     No current facility-administered medications on file prior to visit.      Allergies   Allergen Reactions    Shellfish Derived Anaphylaxis     REVIEW OF SYSTEMS:  Negative unless otherwise reviewed in HPI.    PHYSICAL EXAM   Physical Exam   Ht: 5'9\"             Wt: 249 lbs      BMI: There is no height or weight on file to calculate BMI.   General- No acute distress, well appearing and well nourished.   Eyes Conjunctiva and lids: No erythema, swelling or discharge  Neck appears supple  CV: self-reports reg rate / rhythm   Pulmonary: Respiratory effort: Normal respiration. unlabored  Abdomen: Central adiposity  Neurologic - Coordination: " Normal gait   Extremities: No clubbing, cyanosis  Psychiatric - Orientation to person, place, and time: Normal. Mood and affect: Normal.     ASSESSMENT & PLAN  33 y.o. male presenting for 3 mo post-op visit s/p rygb. Doing well, some ongoing nausea with advancing diet/ meats. Denies regurgitation/vomiting. Getting full protein and fluids. Increasing activity as tolerated and taking appropriate vitamins/supplements. Lab orders placed for today's visit, still needs to complete. Follow up in 3 mo for 6 mo pov, sooner if needed.      Weight Impression  -Initial Weight: 313 lbs  -Today's Weight: 264 lbs  -%Total Weight Loss: -20.0%    Problem List Items Addressed This Visit       Vitamin D deficiency     Labs - see orders         S/P gastric bypass     Pt 3 months post op   s/p GBP  On-going nausea with some solid foods, mainly chicken  diet ok, hitting protein and fluid goals, relying on shakes or protein shots  exercise - increasing as tolerated. Two new foster kids at home keeping him busy.  supplements ok   no acute issues    recs:     cont low carb diet, work on introducing more protein sources of food, still relying on shots. Some meat intolerances. Is planning to try slow cooker recipes for meat  increase exercise  cont supplementation , PPI  FU with dietitian   join support group   RTC 3 months for 6 mo pov  will get 3 months labs- orders placed         Postoperative malabsorption ([facility]) - Primary     Check micronutrient levels - see orders  Adjust micronutrient supplementation per results  Continue recommended post bariatric surgery supplements         Relevant Orders    CBC and Auto Differential    Comprehensive Metabolic Panel    Ferritin    Folate    Iron and TIBC    Vitamin B12    Vitamin D 25-Hydroxy,Total (for eval of Vitamin D levels)      Please see Patient Instructions for today's relevant referrals, orders and instructions.  > 50% of time spent counseling on lifestyle modifications to support weight  loss.      Follow Up: Follow up in about 3 months (around 8/3/2024).   Neha Garcia, APRN-CNP

## 2024-08-07 ENCOUNTER — APPOINTMENT (OUTPATIENT)
Dept: SURGERY | Facility: CLINIC | Age: 34
End: 2024-08-07
Payer: MEDICAID

## 2024-08-07 DIAGNOSIS — E55.9 VITAMIN D DEFICIENCY: ICD-10-CM

## 2024-08-07 DIAGNOSIS — K91.2 POSTOPERATIVE MALABSORPTION (HHS-HCC): Primary | ICD-10-CM

## 2024-08-07 DIAGNOSIS — Z98.84 S/P GASTRIC BYPASS: ICD-10-CM

## 2024-08-07 DIAGNOSIS — G47.33 OSA (OBSTRUCTIVE SLEEP APNEA): ICD-10-CM

## 2024-08-07 NOTE — ASSESSMENT & PLAN NOTE
Pt 6 months post op   s/p GBP  Prior issue with nausea advancing diet resolved.  diet ok, hitting protein and fluid goals, relying on shakes or protein shots  exercise - increasing as tolerated. Two new foster kids at home keeping him busy.  supplements ok   no acute issues     recs:   cont low carb diet, work on introducing more protein sources of food, still relying on shots. Some meat intolerances. Is planning to try slow cooker recipes for meat  increase exercise  cont supplementation , PPI  FU with dietitian   join support group   RTC 6 months for annual pov  Labs reviewed- see orders

## 2024-08-07 NOTE — PROGRESS NOTES
BARIATRIC SURGERY CLINIC  Comprehensive Weight Management        Patient: Soren Cheema   MRN: [id number]      Index Surgery:  Date: 02/07/2024  Surgeon: Dr. Berg  Type: RYGB     Virtual or Telephone Consent:  A telephone visit (audio or visual only) between the patient (at the originating site) and the provider (at the distant site) was utilized to provide this telehealth service. Verbal consent was requested and obtained from Soren Cheema on this date, 08/07/24  for a telehealth visit.      HPI   Soren Cheema is a 34 y.o. male presenting for routine 6 mo post-op apt s/p RYGB 2/07/2024. Initially uncomplicated post-op course. Presented to ED 2/22/24 for possible GI bleed/dehydration after an episode of syncope at home. Determined to have passed clot from blood thinner, no longer on lovenox . Imaging was normal, discharged 2/23 and no further issues. Prior impression- doing well.      Diet:  - regular food diet  - some issues with nausea on-going, no issue with regurgitation. Reinforced need to eat slowly, chew thoroughly, eat smaller more frequent meals. Working to increase dietary protein, trying slow cooker meats. Discussed working off shakes and protein shots.  - Recall: B- protein shake, L- eggs and cheese D- soup S- yogurt     Exercise:  - walking, increasing activity as tolerated.     Concerns related to:  Nausea/Vomiting, Reflux: denies  Abdominal Pain: denies  Diarrhea/Constipation- has some regular BM but constipation persists. Recommend milk of mag daily for now. Discussed adding fiber supplement, increasing dietary fiber and fluids.      Daily Supplements:  Calcium: Calcium Citrate w/ vitamin D (1200 - 1500mg)  Multivitamin & Minerals: 2 per day  Vitamin B12: 500 mcg/day   Vitamin D3: 3000 units   Iron/Other: iron supplement   PPI: taking     Primary Medical Hx:  Patient Active Problem List   Diagnosis    Chronic midline low back pain with right-sided sciatica    Metabolic syndrome    Obesity, Class III, BMI  40-49.9 (morbid obesity) (Multi)    Vitamin D deficiency    S/P gastric bypass    RADHIKA (obstructive sleep apnea)    Postoperative malabsorption ([facility]-HCC)      Past Surgical History:   Procedure Laterality Date    BARIATRIC SURGERY  02/07/2024    miguelina-en-y gastric bypass    COLONOSCOPY        Social History     Socioeconomic History    Marital status:      Spouse name: Not on file    Number of children: Not on file    Years of education: Not on file    Highest education level: Not on file   Occupational History    Not on file   Tobacco Use    Smoking status: Never    Smokeless tobacco: Never   Vaping Use    Vaping status: Never Used   Substance and Sexual Activity    Alcohol use: Not Currently    Drug use: Never    Sexual activity: Defer   Other Topics Concern    Not on file   Social History Narrative    Not on file     Social Determinants of Health     Financial Resource Strain: Low Risk  (2/21/2024)    Overall Financial Resource Strain (CARDIA)     Difficulty of Paying Living Expenses: Not very hard   Food Insecurity: No Food Insecurity (5/24/2023)    Received from [facility]    Hunger Vital Sign     Worried About Running Out of Food in the Last Year: Never true     Ran Out of Food in the Last Year: Never true   Transportation Needs: No Transportation Needs (2/21/2024)    PRAPARE - Transportation     Lack of Transportation (Medical): No     Lack of Transportation (Non-Medical): No   Physical Activity: Insufficiently Active (5/24/2023)    Received from [facility]    Exercise Vital Sign     Days of Exercise per Week: 2 days     Minutes of Exercise per Session: 20 min   Stress: Stress Concern Present (5/24/2023)    Received from [facility]    [place] [employer] - Occupational Stress Questionnaire     Feeling of Stress : Very much   Social Connections: Moderately Integrated (5/24/2023)    Received from [facility]    Social Connection and Isolation Panel [NHANES]      "Frequency of Communication with Friends and Family: Twice a week     Frequency of Social Gatherings with Friends and Family: Once a week     Attends Mu-ism Services: Never     Active Member of Clubs or Organizations: No     Attends Club or Organization Meetings: More than 4 times per year     Marital Status:    Intimate Partner Violence: Not on file   Housing Stability: Low Risk  (2/21/2024)    Housing Stability Vital Sign     Unable to Pay for Housing in the Last Year: No     Number of Places Lived in the Last Year: 1     Unstable Housing in the Last Year: No     Family History   Problem Relation Name Age of Onset    Breast cancer Mother      Edema Father      Edema Sister        Current Outpatient Medications on File Prior to Visit   Medication Sig Dispense Refill    multivitamin-children's (Cerovite, Jr) chewable tablet Chew 2 tablets once daily.      omeprazole (PriLOSEC) 40 mg DR capsule Take 1 capsule (40 mg) by mouth once daily in the morning. Take before meals. Do not crush or chew. Open capsule, sprinkle beads on SF jello, pudding or applesauce. 90 capsule 1    pantoprazole (ProtoNix) 40 mg EC tablet Take 1 tablet (40 mg) by mouth once daily in the morning. Take before meals. Do not crush, chew, or split. 30 tablet 0     No current facility-administered medications on file prior to visit.      Allergies   Allergen Reactions    Shellfish Derived Anaphylaxis     REVIEW OF SYSTEMS:  Negative unless otherwise reviewed in HPI.    PHYSICAL EXAM   Physical Exam   Ht: 5'9\"             Wt: 249 lbs      ***  Alert, well appearing, no acute distress, nourished, hydrated.  Anicteric sclera, no ptosis  Facial symmetry  Neck supple  Unlabored respirations.  Easily conversant without increased respiratory effort  Oriented to person, place, time.  Judgement intact.  Appropriate mood, affect.      ASSESSMENT & PLAN  33 y.o. male presenting for 6 mo post-op visit s/p rygb. ***Doing well, some ongoing nausea with " advancing diet/ meats. Denies regurgitation/vomiting. Getting full protein and fluids. Increasing activity as tolerated and taking appropriate vitamins/supplements. Lab orders placed for today's visit, still needs to complete. Follow up in 3 mo for 6 mo pov, sooner if needed.      Weight Impression  -Initial Weight: 313 lbs  -Today's Weight: 264 lbs  -%Total Weight Loss: -20.0%  Problem List Items Addressed This Visit       Vitamin D deficiency     - taking supplements as discussed.         S/P gastric bypass     Pt 6 months post op   s/p GBP  Prior issue with nausea advancing diet resolved.  diet ok, hitting protein and fluid goals, relying on shakes or protein shots  exercise - increasing as tolerated. Two new foster kids at home keeping him busy.  supplements ok   no acute issues     recs:   cont low carb diet, work on introducing more protein sources of food, still relying on shots. Some meat intolerances. Is planning to try slow cooker recipes for meat  increase exercise  cont supplementation , PPI  FU with dietitian   join support group   RTC 6 months for annual pov  Labs reviewed- see orders          RADHIKA (obstructive sleep apnea)     - discussed practices to promote adequate sleep hygiene  - continue to follow with sleep medicine as needed.         Postoperative malabsorption ([facility]) - Primary     Check micronutrient levels - see orders  Adjust micronutrient supplementation per results  Continue recommended post bariatric surgery supplements        Please see Patient Instructions for today's relevant referrals, orders and instructions.       Follow Up: No follow-ups on file.     Neha Garcia, APRN-CNP

## 2024-08-09 PROBLEM — E86.0 DEHYDRATION: Status: ACTIVE | Noted: 2024-08-09

## 2024-08-09 PROBLEM — G89.18 POSTOPERATIVE PAIN: Status: ACTIVE | Noted: 2024-08-09

## 2024-08-09 PROBLEM — M54.30 SCIATICA: Status: ACTIVE | Noted: 2022-06-03

## 2024-08-09 PROBLEM — Z98.890 POST-OPERATIVE NAUSEA AND VOMITING: Status: ACTIVE | Noted: 2024-08-09

## 2024-08-09 PROBLEM — R55 SYNCOPE: Status: ACTIVE | Noted: 2024-08-09

## 2024-08-09 PROBLEM — R11.2 POST-OPERATIVE NAUSEA AND VOMITING: Status: ACTIVE | Noted: 2024-08-09

## 2024-08-09 PROBLEM — A08.4 VIRAL GASTROENTERITIS: Status: ACTIVE | Noted: 2018-12-18

## 2024-08-09 PROBLEM — R12 HEARTBURN: Status: ACTIVE | Noted: 2024-08-09

## 2024-08-09 PROBLEM — K62.5 RECTAL BLEEDING: Status: ACTIVE | Noted: 2018-03-27

## 2024-08-09 PROBLEM — J30.2 SEASONAL ALLERGIES: Status: ACTIVE | Noted: 2024-08-09

## 2024-08-09 PROBLEM — Z98.84 HISTORY OF BARIATRIC SURGERY: Status: ACTIVE | Noted: 2023-11-20

## 2024-08-09 PROBLEM — R40.0 DAYTIME SOMNOLENCE: Status: ACTIVE | Noted: 2024-08-09

## 2024-08-09 PROBLEM — F41.9 ANXIETY DISORDER: Status: ACTIVE | Noted: 2024-08-09

## 2024-08-13 ENCOUNTER — APPOINTMENT (OUTPATIENT)
Dept: PRIMARY CARE | Facility: CLINIC | Age: 34
End: 2024-08-13
Payer: MEDICAID

## 2024-08-13 VITALS
BODY MASS INDEX: 32.05 KG/M2 | SYSTOLIC BLOOD PRESSURE: 106 MMHG | OXYGEN SATURATION: 97 % | DIASTOLIC BLOOD PRESSURE: 62 MMHG | WEIGHT: 216.4 LBS | HEIGHT: 69 IN | HEART RATE: 63 BPM

## 2024-08-13 DIAGNOSIS — R12 HEARTBURN: ICD-10-CM

## 2024-08-13 DIAGNOSIS — Z98.84 HISTORY OF BARIATRIC SURGERY: ICD-10-CM

## 2024-08-13 DIAGNOSIS — Z00.00 PHYSICAL EXAM: Primary | ICD-10-CM

## 2024-08-13 DIAGNOSIS — E66.09 CLASS 1 OBESITY DUE TO EXCESS CALORIES WITHOUT SERIOUS COMORBIDITY WITH BODY MASS INDEX (BMI) OF 31.0 TO 31.9 IN ADULT: ICD-10-CM

## 2024-08-13 DIAGNOSIS — F41.9 ANXIETY: ICD-10-CM

## 2024-08-13 DIAGNOSIS — F32.A CHRONIC DEPRESSION: ICD-10-CM

## 2024-08-13 DIAGNOSIS — Z13.220 SCREENING FOR LIPID DISORDERS: ICD-10-CM

## 2024-08-13 PROCEDURE — 3008F BODY MASS INDEX DOCD: CPT | Performed by: INTERNAL MEDICINE

## 2024-08-13 PROCEDURE — 99203 OFFICE O/P NEW LOW 30 MIN: CPT | Performed by: INTERNAL MEDICINE

## 2024-08-13 PROCEDURE — 1036F TOBACCO NON-USER: CPT | Performed by: INTERNAL MEDICINE

## 2024-08-13 RX ORDER — HYDROXYZINE PAMOATE 25 MG/1
25 CAPSULE ORAL AS NEEDED
COMMUNITY
Start: 2024-07-15

## 2024-08-13 RX ORDER — BUSPIRONE HYDROCHLORIDE 10 MG/1
1 TABLET ORAL
COMMUNITY
Start: 2024-07-15

## 2024-08-13 RX ORDER — VENLAFAXINE HYDROCHLORIDE 75 MG/1
75 CAPSULE, EXTENDED RELEASE ORAL DAILY
COMMUNITY
Start: 2024-07-16

## 2024-08-13 ASSESSMENT — ENCOUNTER SYMPTOMS
LOSS OF SENSATION IN FEET: 0
DEPRESSION: 0
OCCASIONAL FEELINGS OF UNSTEADINESS: 0

## 2024-08-13 ASSESSMENT — PATIENT HEALTH QUESTIONNAIRE - PHQ9
2. FEELING DOWN, DEPRESSED OR HOPELESS: NOT AT ALL
SUM OF ALL RESPONSES TO PHQ9 QUESTIONS 1 & 2: 0
1. LITTLE INTEREST OR PLEASURE IN DOING THINGS: NOT AT ALL

## 2024-08-13 NOTE — PROGRESS NOTES
Internal Medicine Outpatient Visit  Chief Complaint   Patient presents with    New Patient Visit     New patient, and physical for foster care to renew.        HPI: Soren Cheema is of 34 y.o. who is here for Internal Visit for the following issues:  Patient is seen office today for the first time because he needs a form to be filled for physical examination related to foster care.  It is his first with this is very difficult for me to give all the information about his past medical history including chronic illness but according to the patient he has a gastric bypass surgery in February of this year and had some malabsorption issues after that but now he has no symptoms.  He also informs me that he is being followed by his psychiatrist for anxiety.  He denies any fever chill Anexsia.  Has no headache or visual disturbances.  No chest pain or palpitation.  Has no shortness of breath or wheezing.  Denies any nausea matting pain abdomen he has no problem with absorption at this time.  He has no urinary symptoms.  Denies any weakness of any extremity.  His anxiety symptoms are controlled on the current medications.  Review of other systems are negative.    Past medical history:  1.  History of childhood asthma  2.  Anxiety  3.  Depression  3.  Metabolic syndrome  4.  Morbid obesity  5.  Nephrolithiasis  6.  History of recent gastric bypass surgery    Social history he never smoked he denies using alcoholic drinks or any recreational drugs    Past Surgical History:   Procedure Laterality Date    BARIATRIC SURGERY  02/07/2024    miguelina-en-y gastric bypass    COLONOSCOPY         Family History   Problem Relation Name Age of Onset    Breast cancer Mother      Edema Father      Edema Sister           Current Outpatient Medications on File Prior to Visit   Medication Sig Dispense Refill    busPIRone (Buspar) 10 mg tablet Take 1 tablet (10 mg) by mouth every 12 hours.      hydrOXYzine pamoate (Vistaril) 25 mg capsule Take 1  "capsule (25 mg) by mouth if needed for anxiety.      multivitamin-children's (Cerovite, Jr) chewable tablet Chew 2 tablets once daily.      venlafaxine XR (Effexor-XR) 75 mg 24 hr capsule Take 1 capsule (75 mg) by mouth once daily.      omeprazole (PriLOSEC) 40 mg DR capsule Take 1 capsule (40 mg) by mouth once daily in the morning. Take before meals. Do not crush or chew. Open capsule, sprinkle beads on SF jello, pudding or applesauce. 90 capsule 1    [DISCONTINUED] pantoprazole (ProtoNix) 40 mg EC tablet Take 1 tablet (40 mg) by mouth once daily in the morning. Take before meals. Do not crush, chew, or split. (Patient not taking: Reported on 8/13/2024) 30 tablet 0     No current facility-administered medications on file prior to visit.       Blood pressure 106/62, pulse 63, height 1.753 m (5' 9\"), weight 98.2 kg (216 lb 6.4 oz), SpO2 97%.  Body mass index is 31.96 kg/m².  General examination: Overweight  Eyes: There is no pallor or jaundice pupils are equal and reactive to light  Neck: There is no JVD or thyroid enlargement  Lungs: Clear to auscultation  CVS: Heart sounds are regular there is no gallop murmur  Abdomen: There is no organomegaly tenderness scar of previous surgery is noted  Genitourinary system there is no CVA tenderness  CNS: Normal power  Musculoskeletal system there is no joint swelling or deformity  Legs: No edema      1. Physical exam  Physical examination normal except being overweight.  Diet excise activity discussed with patient.  He has a recent gastric bypass surgery and is being followed by his surgical team.  - Comprehensive metabolic panel; Future  - CBC and Auto Differential; Future  - Lipid panel; Future    2. Class 1 obesity due to excess calories without serious comorbidity with body mass index (BMI) of 31.0 to 31.9 in adult  Diet excise activity reviewed with the patient  - Comprehensive metabolic panel; Future  - CBC and Auto Differential; Future  - Lipid panel; Future    3. " Anxiety  Patient is advised to get a separate report from his psychiatrist regarding foster care evaluation  - Comprehensive metabolic panel; Future  - CBC and Auto Differential; Future  - Lipid panel; Future    4. History of bariatric surgery  Recovery after surgery has been as expected.  He has no GI symptoms  - Comprehensive metabolic panel; Future  - CBC and Auto Differential; Future  - Lipid panel; Future    5. Heartburn  Patient takes omeprazole as needed  - Comprehensive metabolic panel; Future  - CBC and Auto Differential; Future  - Lipid panel; Future    6. Screening for lipid disorders  - Comprehensive metabolic panel; Future  - CBC and Auto Differential; Future  - Lipid panel; Future    Patient's follow-up appointment will depend on the result of the test ordered today.

## 2024-11-03 ENCOUNTER — APPOINTMENT (OUTPATIENT)
Dept: CT IMAGING | Age: 34
End: 2024-11-03

## 2024-11-03 ENCOUNTER — HOSPITAL ENCOUNTER (EMERGENCY)
Age: 34
Discharge: PSYCHIATRIC OR SUBSTANCE USE FACILITY | End: 2024-11-03
Attending: STUDENT IN AN ORGANIZED HEALTH CARE EDUCATION/TRAINING PROGRAM

## 2024-11-03 VITALS
SYSTOLIC BLOOD PRESSURE: 119 MMHG | BODY MASS INDEX: 29.12 KG/M2 | HEART RATE: 74 BPM | HEIGHT: 72 IN | TEMPERATURE: 98.9 F | RESPIRATION RATE: 18 BRPM | WEIGHT: 215 LBS | DIASTOLIC BLOOD PRESSURE: 59 MMHG | OXYGEN SATURATION: 98 %

## 2024-11-03 DIAGNOSIS — R41.82 ALTERED MENTAL STATUS, UNSPECIFIED ALTERED MENTAL STATUS TYPE: ICD-10-CM

## 2024-11-03 DIAGNOSIS — R45.851 SUICIDAL IDEATION: Primary | ICD-10-CM

## 2024-11-03 DIAGNOSIS — F32.A DEPRESSION, UNSPECIFIED DEPRESSION TYPE: ICD-10-CM

## 2024-11-03 LAB
AMPHET UR QL SCN: NORMAL
ANION GAP SERPL CALCULATED.3IONS-SCNC: 14 MEQ/L (ref 9–15)
APAP SERPL-MCNC: <5 UG/ML (ref 10–30)
BACTERIA URNS QL MICRO: NEGATIVE /HPF
BARBITURATES UR QL SCN: NORMAL
BASOPHILS # BLD: 0 K/UL (ref 0–0.2)
BASOPHILS NFR BLD: 0.5 %
BENZODIAZ UR QL SCN: NORMAL
BILIRUB UR QL STRIP: NEGATIVE
BUN SERPL-MCNC: 8 MG/DL (ref 6–20)
CALCIUM SERPL-MCNC: 9.5 MG/DL (ref 8.5–9.9)
CANNABINOIDS UR QL SCN: NORMAL
CHLORIDE SERPL-SCNC: 102 MEQ/L (ref 95–107)
CHOLEST SERPL-MCNC: 138 MG/DL (ref 0–199)
CK SERPL-CCNC: 60 U/L (ref 0–190)
CLARITY UR: CLEAR
CO2 SERPL-SCNC: 24 MEQ/L (ref 20–31)
COCAINE UR QL SCN: NORMAL
COLOR UR: YELLOW
CREAT SERPL-MCNC: 0.78 MG/DL (ref 0.7–1.2)
DRUG SCREEN COMMENT UR-IMP: NORMAL
EOSINOPHIL # BLD: 0.1 K/UL (ref 0–0.7)
EOSINOPHIL NFR BLD: 1.6 %
EPI CELLS #/AREA URNS AUTO: ABNORMAL /HPF (ref 0–5)
ERYTHROCYTE [DISTWIDTH] IN BLOOD BY AUTOMATED COUNT: 12.5 % (ref 11.5–14.5)
ETHANOL PERCENT: 0.02 G/DL
ETHANOLAMINE SERPL-MCNC: 24 MG/DL (ref 0–0.08)
FENTANYL SCREEN, URINE: NORMAL
GLUCOSE SERPL-MCNC: 99 MG/DL (ref 70–99)
GLUCOSE UR STRIP-MCNC: NEGATIVE MG/DL
HCT VFR BLD AUTO: 41.9 % (ref 42–52)
HDLC SERPL-MCNC: 51 MG/DL (ref 40–59)
HGB BLD-MCNC: 14.8 G/DL (ref 14–18)
HGB UR QL STRIP: ABNORMAL
HYALINE CASTS #/AREA URNS AUTO: ABNORMAL /HPF (ref 0–5)
KETONES UR STRIP-MCNC: ABNORMAL MG/DL
LDLC SERPL CALC-MCNC: 74 MG/DL (ref 0–129)
LEUKOCYTE ESTERASE UR QL STRIP: NEGATIVE
LYMPHOCYTES # BLD: 2.2 K/UL (ref 1–4.8)
LYMPHOCYTES NFR BLD: 34.1 %
MCH RBC QN AUTO: 30.3 PG (ref 27–31.3)
MCHC RBC AUTO-ENTMCNC: 35.3 % (ref 33–37)
MCV RBC AUTO: 85.7 FL (ref 79–92.2)
METHADONE UR QL SCN: NORMAL
MONOCYTES # BLD: 0.7 K/UL (ref 0.2–0.8)
MONOCYTES NFR BLD: 10.4 %
MUCOUS THREADS URNS QL MICRO: PRESENT /LPF
NEUTROPHILS # BLD: 3.4 K/UL (ref 1.4–6.5)
NEUTS SEG NFR BLD: 53.1 %
NITRITE UR QL STRIP: NEGATIVE
OPIATES UR QL SCN: NORMAL
OXYCODONE UR QL SCN: NORMAL
PCP UR QL SCN: NORMAL
PH UR STRIP: 6 [PH] (ref 5–9)
PLATELET # BLD AUTO: 379 K/UL (ref 130–400)
POTASSIUM SERPL-SCNC: 3.6 MEQ/L (ref 3.4–4.9)
PROPOXYPH UR QL SCN: NORMAL
PROT UR STRIP-MCNC: 30 MG/DL
RBC # BLD AUTO: 4.89 M/UL (ref 4.7–6.1)
RBC #/AREA URNS AUTO: ABNORMAL /HPF (ref 0–5)
SALICYLATES SERPL-MCNC: <0.3 MG/DL (ref 15–30)
SODIUM SERPL-SCNC: 140 MEQ/L (ref 135–144)
SP GR UR STRIP: 1.02 (ref 1–1.03)
TRIGL SERPL-MCNC: 67 MG/DL (ref 0–150)
TSH REFLEX: 2.39 UIU/ML (ref 0.44–3.86)
URINE REFLEX TO CULTURE: ABNORMAL
UROBILINOGEN UR STRIP-ACNC: 1 E.U./DL
WBC # BLD AUTO: 6.4 K/UL (ref 4.8–10.8)
WBC #/AREA URNS AUTO: ABNORMAL /HPF (ref 0–5)

## 2024-11-03 PROCEDURE — 82550 ASSAY OF CK (CPK): CPT

## 2024-11-03 PROCEDURE — 80179 DRUG ASSAY SALICYLATE: CPT

## 2024-11-03 PROCEDURE — 80061 LIPID PANEL: CPT

## 2024-11-03 PROCEDURE — 80048 BASIC METABOLIC PNL TOTAL CA: CPT

## 2024-11-03 PROCEDURE — 93005 ELECTROCARDIOGRAM TRACING: CPT | Performed by: STUDENT IN AN ORGANIZED HEALTH CARE EDUCATION/TRAINING PROGRAM

## 2024-11-03 PROCEDURE — 81001 URINALYSIS AUTO W/SCOPE: CPT

## 2024-11-03 PROCEDURE — 99285 EMERGENCY DEPT VISIT HI MDM: CPT

## 2024-11-03 PROCEDURE — 70450 CT HEAD/BRAIN W/O DYE: CPT

## 2024-11-03 PROCEDURE — 80307 DRUG TEST PRSMV CHEM ANLYZR: CPT

## 2024-11-03 PROCEDURE — 84443 ASSAY THYROID STIM HORMONE: CPT

## 2024-11-03 PROCEDURE — 72125 CT NECK SPINE W/O DYE: CPT

## 2024-11-03 PROCEDURE — 85025 COMPLETE CBC W/AUTO DIFF WBC: CPT

## 2024-11-03 PROCEDURE — 80143 DRUG ASSAY ACETAMINOPHEN: CPT

## 2024-11-03 PROCEDURE — 82077 ASSAY SPEC XCP UR&BREATH IA: CPT

## 2024-11-03 RX ORDER — HYDROXYZINE PAMOATE 25 MG/1
25 CAPSULE ORAL PRN
COMMUNITY
Start: 2024-07-15

## 2024-11-03 RX ORDER — BUSPIRONE HYDROCHLORIDE 10 MG/1
10 TABLET ORAL 2 TIMES DAILY
COMMUNITY
Start: 2024-07-15

## 2024-11-03 RX ORDER — VENLAFAXINE HYDROCHLORIDE 75 MG/1
75 CAPSULE, EXTENDED RELEASE ORAL DAILY
COMMUNITY
Start: 2024-07-16

## 2024-11-03 ASSESSMENT — LIFESTYLE VARIABLES
HOW OFTEN DO YOU HAVE A DRINK CONTAINING ALCOHOL: 2-4 TIMES A MONTH
HOW MANY STANDARD DRINKS CONTAINING ALCOHOL DO YOU HAVE ON A TYPICAL DAY: 1 OR 2

## 2024-11-03 NOTE — ED NOTES
Patient states that he takes Venlafaxine and Buspirone but is not completely sure of the dosages.  Will need further clarification.

## 2024-11-03 NOTE — ED NOTES
Patient was changed into psych safe clothing. In Main ER   Urine, lab and EKG have been completed.   Skin and contraband check performed. Contraband check negative at this time.     CHRISTEL[facility] RN at bedside for psych assessment

## 2024-11-03 NOTE — ED NOTES
Patient is resting in bed, eyes closed. Respirations even and unlabored, no distress noted.    [facility] has been notified of potential need for indigent bed funding if patient's insurance cannot be confirmed or is inactive.
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
## 2024-11-03 NOTE — ED NOTES
Patient changed to Psych safe clothing.  Belongings check completed.  Contraband check is negative.

## 2024-11-03 NOTE — ED TRIAGE NOTES
Patient to ED via EMS.  EMS reports that patient was found in his closet by his son.  Son reports that patient has been searching on line for ways to kill himself.  Also relates that patient was at a concert tonight.  Patient arrives AMS and minimally responsive.  LIZZETH most of triage due to patient not verbalizing.  PERRLA @3-4mm.  Patient winces eyelids with visual field threat.  Shaking noted to entire body upon straight cath.  Sternal rub multiple times with no response.  Skin pink, warm, dry.  Nail beds slightly dusky.  Respirations even and non-labored.

## 2024-11-03 NOTE — ED NOTES
Provisional Diagnosis:   Depression with suicide attempt    Psychosocial and Contextual Factors:       Patient was raised in [place] and graduated from [employer]. As a child, he spent time in foster care as his mother was in skilled nursing and he did not know his father. Patient currently lives in [place] with his wife. They have been  for 8 years, though per the patient, have been having problems for the last week. Spouse still lives in the home, as does patient's 13 year-old nephew (patient has fostered his nephew since the age of 6). Patient and his wife have fostered 16 children over the 8 years of their marriage, but have \"had problems having their own\" children. Patient is employed, and works as a .    Insurance (per patient report): [employer]; attempts to verify insurance have been unsuccessful through 0520 this morning.    C-SSRS Summary:       C-SSRS Suicide Screening1) Within the past month, have you wished you were dead or wished you could go to sleep and not wake up? : Yes2) Have you actually had any thoughts of killing yourself? : Yes3) Have you been thinking about how you might kill yourself? : Yes4) Have you had these thoughts and had some intention of acting on them? : Yes5) Have you started to work out or worked out the details of how to kill yourself? Do you intend to carry out this plan? : Yes6) Have you ever done anything, started to do anything, or prepared to do anything to end your life?: YesDid this occur within the past 3 months? : Yes    Risk of Suicide: High Risk    C-SSRS Risk AssessmentSuicidal and Self-Injurious Behavior : Actual suicide attempt - Past 3 monthsSuicidal Ideation (Most Severe in Past Month): Wish to be dead; Suicidal thoughts; Suicidal thoughts with method (but without specific plan or intent to act); Suicidal intent with specific planActivating Events (Recent): Recent loss(es) or other significant negative event(s) (legal, financial,

## 2024-11-03 NOTE — ED NOTES
Call placed to [facility]. Stated they are still working on placement. Will call CHRISTELLE when they have more information.

## 2024-11-03 NOTE — ED NOTES
Received call from Samanta at [facility]. Pt accepted. Will fax pink slip and set up transport.Nurse to Nurse [phone].

## 2024-11-03 NOTE — ED NOTES
Call placed to [facility]. No discharges today. Unit is at capacity Will notify [facility] of need for assessment for indigent bed..

## 2024-11-03 NOTE — ED NOTES
[facility] approved [facility]. They are looking for placement.

## 2024-11-03 NOTE — ED PROVIDER NOTES
Negative    Leukocyte Esterase, Urine Negative Negative    Urine Reflex to Culture Not Indicated    Microscopic Urinalysis   Result Value Ref Range    Mucus, UA Present None Seen /LPF    Bacteria, UA Negative Negative /HPF    Hyaline Casts, UA 10-20 0 - 5 /HPF    WBC, UA 3-5 0 - 5 /HPF    RBC, UA 10-20 (A) 0 - 5 /HPF    Epithelial Cells, UA 3-5 0 - 5 /HPF   EKG 12 Lead   Result Value Ref Range    Ventricular Rate 76 BPM    Atrial Rate 76 BPM    P-R Interval 174 ms    QRS Duration 98 ms    Q-T Interval 388 ms    QTc Calculation (Bazett) 436 ms    P Axis 70 degrees    R Axis 64 degrees    T Axis 57 degrees         IMPRESSION/MDM/ED COURSE:  34 y.o. male presented with acute altered mental status, possible suicidal ideation  Differential: Signs of trauma no signs of infection, no signs of neck injury or spinal cord injury  VS: WNL    EKG my interpretation: Normal sinus rhythm normal intervals normal axis no significant ST segment or T wave abnormalities    Labs are largely unremarkable aside from slightly elevated EtOH not clinically intoxicated    ED Course as of 11/03/24 0619   Sun Nov 03, 2024   0225 CT head my interpretation: No acute findings [SF]   0245 Patient reevaluated he is now awake alert, answer questions appropriately, he admits that he is suicidal, he said he has missed his venlafaxine dose for couple days and has been having recurrent thoughts from his childhood that are unrelated, he has felt depressed for a while now.  Denies drugs he admits to 2 alcoholic beverages tonight, denies homicidal ideation, no visual hallucination [SF]   0313 Patient medically cleared for psych evaluation [SF]   0618 Psych consulted, plans for transfer as we are full over this weekend    Nursing staff did confirm patient attempted to commit suicide twice today and was upset that it did not work, he is pink slipped    At this time patient is stable has no complaints he is alert and oriented no concerns at this time [SF]

## 2024-11-04 NOTE — ED NOTES
Patient up to bathroom per self and back to room without incident. Patient updated on ETA for transport to [facility] and was given portable phone to call family.

## 2024-11-04 NOTE — ED NOTES
Patient continues to rest in bed, respirations even and unlabored.     Await [facility] arrival for pick-up for transport to [facility].    Call to [facility] to check on ETA at 2054; THONY Schuster stated \"it's going to be at least 30-45 minutes.\"

## 2024-11-04 NOTE — ED NOTES
Nurse-to-Nurse report called to May[facility] Crenshaw ([phone]), spoke with STEPHANIE Bro. Full report given, including events leading to ER visit and results of medical work-up. All questions answered and [facility] nurse was updated on delayed ETA per [facility].

## 2024-11-04 NOTE — ED NOTES
[facility] here to  patient for transport to [facility]. Patient cooperative with getting on cot. All paperwork, pink slip, and patient's belongings given to [facility] crew.

## 2024-11-04 NOTE — ED NOTES
Patient is resting in bed, eyes closed. Respirations even and unlabored, no distress noted.    Updated ETA for [facility] approx [phone].

## 2024-11-05 LAB
EKG ATRIAL RATE: 76 BPM
EKG P AXIS: 70 DEGREES
EKG P-R INTERVAL: 174 MS
EKG Q-T INTERVAL: 388 MS
EKG QRS DURATION: 98 MS
EKG QTC CALCULATION (BAZETT): 436 MS
EKG R AXIS: 64 DEGREES
EKG T AXIS: 57 DEGREES
EKG VENTRICULAR RATE: 76 BPM

## 2024-11-05 PROCEDURE — 93010 ELECTROCARDIOGRAM REPORT: CPT | Performed by: INTERNAL MEDICINE

## (undated) DEVICE — NEEDLE, EPIDURAL 17G X 4 1/2 PERIFIX

## (undated) DEVICE — TROCAR, OPTICAL, BLADELESS, KII FIOS, 5 X 100MM, THREADED

## (undated) DEVICE — SLEEVE, KII, Z-THREAD, 5X100CM

## (undated) DEVICE — BINDER, ABDOMINAL, 4 PANEL, 12 X 63-74 IN

## (undated) DEVICE — NEEDLE, CLOSURE, OMNICLOSE

## (undated) DEVICE — SYSTEM, FIOS FIRST ENTRY, 12 X 100MM, KII ADVANCED FIXATION

## (undated) DEVICE — SLEEVE, KII, Z-THREAD, 12X100CM

## (undated) DEVICE — Device

## (undated) DEVICE — ENDO, GIA 60 MED/THCK ARTIC VASC RELOAD

## (undated) DEVICE — CARE KIT, LAPAROSCOPIC, ADVANCED

## (undated) DEVICE — ENDO, GIA HANDLE XL

## (undated) DEVICE — APPLICATOR, CHLORAPREP, W/ORANGE TINT, 26ML

## (undated) DEVICE — ENDO, GIA 45 MED/THCK ARTIC RELOAD

## (undated) DEVICE — RELOAD, ENDOSTITCH, POLYSORB, 2-0, 48 IN, ES9, VIOLET, SULU

## (undated) DEVICE — KIT, LAP GASTRIC BYPASS, CUSTOM, PARMA

## (undated) DEVICE — MARKER, SKIN, REGULAR TIP, W/FLEXI-RULER

## (undated) DEVICE — DRAPE, SHEET, THREE QUARTER, FAN FOLD, 57 X 77 IN

## (undated) DEVICE — PROTECTOR, HEEL/ANKLE/ELBOW, UNIVERSAL

## (undated) DEVICE — RELOAD, ENDOSTITCH, SURGIDAC, 0, 48 IN, GREEN, SULU

## (undated) DEVICE — DEVICE, SUTURE, ENDOSTITCH, 10 MM

## (undated) DEVICE — RELOAD, ENDOSTITCH, SURGIDAC, 2-0, 48 IN, GREEN, SULU

## (undated) DEVICE — LUBRICANT, WATER SOLUBLE, BACTERIOSTATIC, 2 OZ, STERILE

## (undated) DEVICE — STAPLE, BIOABSORB, SEAMGUARD, TRI60 PURPLE

## (undated) DEVICE — MAT, AIR TRANSFER, 39X81

## (undated) DEVICE — RESERVOIR, DRAINAGE, WOUND, JACKSON-PRATT, 100 CC, SILICONE

## (undated) DEVICE — SLEEVE, VASO PRESS, CALF GARMENT, LARGE, GREEN

## (undated) DEVICE — TROCAR, OPTICAL, BLADELESS, 12MM, THREADED, 100MM LENGTH

## (undated) DEVICE — LAVAGE KIT, GASTRIC, EDLICH, 34 FR, 36 IN

## (undated) DEVICE — ENDO, GIA 60 MED/THCK ARTIC RELOAD

## (undated) DEVICE — SYRINGE, 30 CC, LUER LOCK

## (undated) DEVICE — ENDO, GIA 45 MED/THCK ARTIC VASC RELOAD

## (undated) DEVICE — LIGASURE, L-HOOK 44CM SEALER/DIVIDER LAP, MARYLAND